# Patient Record
Sex: FEMALE | Race: WHITE | HISPANIC OR LATINO | Employment: STUDENT | URBAN - METROPOLITAN AREA
[De-identification: names, ages, dates, MRNs, and addresses within clinical notes are randomized per-mention and may not be internally consistent; named-entity substitution may affect disease eponyms.]

---

## 2021-03-24 ENCOUNTER — APPOINTMENT (OUTPATIENT)
Dept: LAB | Facility: CLINIC | Age: 21
End: 2021-03-24
Payer: COMMERCIAL

## 2021-03-24 ENCOUNTER — OFFICE VISIT (OUTPATIENT)
Dept: INTERNAL MEDICINE CLINIC | Facility: CLINIC | Age: 21
End: 2021-03-24
Payer: COMMERCIAL

## 2021-03-24 ENCOUNTER — TELEPHONE (OUTPATIENT)
Dept: ADMINISTRATIVE | Facility: OTHER | Age: 21
End: 2021-03-24

## 2021-03-24 VITALS
HEIGHT: 65 IN | WEIGHT: 153.6 LBS | DIASTOLIC BLOOD PRESSURE: 88 MMHG | SYSTOLIC BLOOD PRESSURE: 138 MMHG | BODY MASS INDEX: 25.59 KG/M2 | HEART RATE: 76 BPM | TEMPERATURE: 98 F | OXYGEN SATURATION: 97 %

## 2021-03-24 DIAGNOSIS — F32.A MILD DEPRESSION: ICD-10-CM

## 2021-03-24 DIAGNOSIS — Z96.21 COCHLEAR IMPLANT IN PLACE: ICD-10-CM

## 2021-03-24 DIAGNOSIS — Z00.00 ANNUAL PHYSICAL EXAM: Primary | ICD-10-CM

## 2021-03-24 DIAGNOSIS — H90.5 CONGENITAL DEAFNESS: ICD-10-CM

## 2021-03-24 DIAGNOSIS — Z02.1 PRE-EMPLOYMENT EXAMINATION: ICD-10-CM

## 2021-03-24 PROCEDURE — 99385 PREV VISIT NEW AGE 18-39: CPT | Performed by: FAMILY MEDICINE

## 2021-03-24 PROCEDURE — 3008F BODY MASS INDEX DOCD: CPT | Performed by: FAMILY MEDICINE

## 2021-03-24 PROCEDURE — 36415 COLL VENOUS BLD VENIPUNCTURE: CPT

## 2021-03-24 PROCEDURE — 86480 TB TEST CELL IMMUN MEASURE: CPT

## 2021-03-24 PROCEDURE — 3725F SCREEN DEPRESSION PERFORMED: CPT | Performed by: FAMILY MEDICINE

## 2021-03-24 PROCEDURE — 1036F TOBACCO NON-USER: CPT | Performed by: FAMILY MEDICINE

## 2021-03-24 NOTE — PROGRESS NOTES
ADULT ANNUAL PHYSICAL   [address]    NAME: Cristina Figueroa  AGE: 24 y o  SEX: female  : 2000     DATE: 3/24/2021     Assessment and Plan:     Problem List Items Addressed This Visit        Nervous and Auditory    Congenital deafness       Other    Cochlear implant in place      Other Visit Diagnoses     Annual physical exam    -  Primary    Pre-employment examination        Relevant Orders    Quantiferon TB Gold Plus    Mild depression ([address] )            Plan: well adult  quantiferon order in cifuentes of TST  Pt will notify me if she needs immunization titers for employment  Follows with audiology yearly for cochlear implant maintenance  PHQ9 OF 5  Mild  No SI  Pt manages with forms of wellness such as exercise and yoga  Will continue to monitor  Immunizations and preventive care screenings were discussed with patient today  Appropriate education was printed on patient's after visit summary  Counseling:  Dental Health: discussed importance of regular tooth brushing, flossing, and dental visits  Injury prevention: discussed safety/seat belts, safety helmets, smoke detectors, carbon dioxide detectors, and smoking near bedding or upholstery  · Sexual health: discussed sexually transmitted diseases, partner selection, use of condoms, avoidance of unintended pregnancy, and contraceptive alternatives  BMI Counseling: Body mass index is 25 96 kg/m²  The BMI is above normal  Nutrition recommendations include limiting drinks that contain sugar and moderation in carbohydrate intake  Return in about 1 year (around 3/24/2022) for Annual physical      Chief Complaint:     Chief Complaint   Patient presents with    Establish Care      History of Present Illness:     Adult Annual Physical   Patient here for a comprehensive physical exam  The patient reports no problems  Need physical form completed for school  Going to [employer] and [employer] [place] Diet and Physical Activity  · Diet/Nutrition: well balanced diet  · Exercise: 3-4 times a week on average  Depression Screening  PHQ-9 Depression Screening    PHQ-9:   Frequency of the following problems over the past two weeks:      Little interest or pleasure in doing things: 0 - not at all  Feeling down, depressed, or hopeless: 1 - several days  Trouble falling or staying asleep, or sleeping too much: 2 - more than half the days  Feeling tired or having little energy: 0 - not at all  Poor appetite or overeatin - several days  Feeling bad about yourself - or that you are a failure or have let yourself or your family down: 1 - several days  Trouble concentrating on things, such as reading the newspaper or watching television: 0 - not at all  Moving or speaking so slowly that other people could have noticed  Or the opposite - being so fidgety or restless that you have been moving around a lot more than usual: 0 - not at all  Thoughts that you would be better off dead, or of hurting yourself in some way: 0 - not at all  PHQ-2 Score: 1  PHQ-9 Score: 5       General Health  · Sleep: sleeps well  · Hearing: cochlear implant right side only   · Vision: no vision problems  · Dental: regular dental visits  /GYN Health  · Last menstrual period: 3/15/2021  · Contraceptive method: none   · History of STDs?: no      Review of Systems:     Review of Systems   HENT: Positive for hearing loss  Eyes: Negative for visual disturbance  Respiratory: Negative for shortness of breath  Cardiovascular: Negative for leg swelling  Gastrointestinal: Negative for constipation and diarrhea  Genitourinary: Negative for difficulty urinating and frequency  Musculoskeletal: Negative for arthralgias, back pain and gait problem  Neurological: Negative for dizziness, light-headedness and headaches  Psychiatric/Behavioral: Negative for sleep disturbance and suicidal ideas   The patient is not nervous/anxious  Past Medical History:     Past Medical History:   Diagnosis Date    Deaf, bilateral       Past Surgical History:     Past Surgical History:   Procedure Laterality Date    COCHLEAR IMPLANT Right     REDUCTION MAMMAPLASTY        Social History:     E-Cigarette/Vaping    E-Cigarette Use Never User      E-Cigarette/Vaping Substances    Nicotine No     THC No     CBD No     Flavoring No     Other No     Unknown No      Social History     Socioeconomic History    Marital status: Single     Spouse name: None    Number of children: None    Years of education: None    Highest education level: None   Occupational History    None   Social Needs    Financial resource strain: None    Food insecurity     Worry: None     Inability: None    Transportation needs     Medical: None     Non-medical: None   Tobacco Use    Smoking status: Never Smoker    Smokeless tobacco: Never Used   Substance and Sexual Activity    Alcohol use: Yes     Frequency: 2-3 times a week     Drinks per session: 1 or 2     Binge frequency: Never     Comment: wine-socially    Drug use: Not Currently    Sexual activity: Not Currently     Partners: Male   Lifestyle    Physical activity     Days per week: 7 days     Minutes per session: 40 min    Stress: Not at all   Relationships    Social connections     Talks on phone: None     Gets together: None     Attends [employer] service: None     Active member of club or organization: None     Attends meetings of clubs or organizations: None     Relationship status: None    Intimate partner violence     Fear of current or ex partner: None     Emotionally abused: None     Physically abused: None     Forced sexual activity: None   Other Topics Concern    None   Social History Narrative    None      Family History:     History reviewed  No pertinent family history  Current Medications:     No current outpatient medications on file       No current facility-administered medications for this visit         Allergies:     No Known Allergies   Physical Exam:     /88 (BP Location: Left arm, Patient Position: Sitting, Cuff Size: Standard)   Pulse 76   Temp 98 °F (36 7 °C) (Temporal) Comment: NO NSAIDS  Ht 5' 4 5" (1 638 m)   Wt 69 7 kg (153 lb 9 6 oz)   SpO2 97%   BMI 25 96 kg/m²     Physical Exam     Jori Reynolds,    MEDICAL [address]

## 2021-03-24 NOTE — TELEPHONE ENCOUNTER
----- Message from Valeriano Moody sent at 3/24/2021  9:52 AM EDT -----  Regarding: PAP SMEAR [facility] INTERNAL MED  03/24/21 9:53 AM    Hello, our patient Apryl Parada has had Pap Smear (HPV) aka Cervical Cancer Screening completed/performed   Please assist in updating the patient chart by making an External outreach to [facility] located in [facility] The date of service is  per pt states this was recent P#;[phone]     Thank you,  Sarah Bullard MA  [facility]  [address]

## 2021-03-24 NOTE — PATIENT INSTRUCTIONS

## 2021-03-24 NOTE — LETTER
Procedure Request Form: Cervical Cancer Screening      Date Requested: 21  Patient: Ailyn Sale  Patient : 2000   Referring Provider: Benito Ingram, DO        Date of Procedure ______________________________       The above patient has informed us that they have completed their   most recent Cervical Cancer Screening at your facility  Please complete   this form and attach all corresponding procedure reports/results  Comments __________________________________________________________  ____________________________________________________________________  ____________________________________________________________________  ____________________________________________________________________    Facility Completing Procedure _________________________________________    Form Completed By (print name) _______________________________________      Signature __________________________________________________________      These reports are needed for  compliance    Please fax this completed form and a copy of the procedure report to our office located at [place] [address] as soon as possible to [phone] jerod Roman [phone]    We thank you for your assistance in treating our mutual patient     [facility]  [phone]

## 2021-03-24 NOTE — LETTER
Procedure Request Form: Cervical Cancer Screening      Date Requested: 21  Patient: Fara Bank  Patient : 2000   Referring Provider: Juanis Luong, DO        Date of Procedure ______________________________       The above patient has informed us that they have completed their   most recent Cervical Cancer Screening at your facility  Please complete   this form and attach all corresponding procedure reports/results  Comments __________________________________________________________  ____________________________________________________________________  ____________________________________________________________________  ____________________________________________________________________    Facility Completing Procedure _________________________________________    Form Completed By (print name) _______________________________________      Signature __________________________________________________________      These reports are needed for  compliance    Please fax this completed form and a copy of the procedure report to our office located at [place] [address] as soon as possible to [phone] jerod [address] [phone]    We thank you for your assistance in treating our mutual patient     [facility]  [phone]

## 2021-03-24 NOTE — LETTER
Procedure Request Form: Cervical Cancer Screening      Date Requested: 21  Patient: Ailyn Sale  Patient : 2000   Referring Provider: Benito Ingram, DO        Date of Procedure ______________________________       The above patient has informed us that they have completed their   most recent Cervical Cancer Screening at your facility  Please complete   this form and attach all corresponding procedure reports/results  Comments __________________________________________________________  ____________________________________________________________________  ____________________________________________________________________  ____________________________________________________________________    Facility Completing Procedure _________________________________________    Form Completed By (print name) _______________________________________      Signature __________________________________________________________      These reports are needed for  compliance    Please fax this completed form and a copy of the procedure report to our office located at [place] [address] as soon as possible to [phone] jerod Roman [phone]    We thank you for your assistance in treating our mutual patient       Medical Records [address] [place] [address]  [phone] F [phone]

## 2021-03-25 LAB
GAMMA INTERFERON BACKGROUND BLD IA-ACNC: 0.03 IU/ML
M TB IFN-G BLD-IMP: NEGATIVE
M TB IFN-G CD4+ BCKGRND COR BLD-ACNC: 0 IU/ML
M TB IFN-G CD4+ BCKGRND COR BLD-ACNC: 0.01 IU/ML
MITOGEN IGNF BCKGRD COR BLD-ACNC: >10 IU/ML

## 2021-03-25 NOTE — TELEPHONE ENCOUNTER
Upon review of the In Basket request and the patient's chart, initial outreach has been made via fax, please see Contacts section for details       Thank you  Isra Lewis

## 2021-04-01 NOTE — TELEPHONE ENCOUNTER
As a follow-up, a second attempt has been made for outreach via fax, please see Contacts section for details      Thank you  Jarrell Sequeira

## 2021-04-07 NOTE — TELEPHONE ENCOUNTER
As a final attempt, a third outreach has been made via telephone call  The outcome of the telephone call was a fax request form to be sent to Office  Please see Contacts section for details  This encounter will be closed and completed by end of day  Should we receive the requested information because of previous outreach attempts, the requested patient's chart will be updated appropriately       Thank you  René Lopez

## 2021-05-10 NOTE — TELEPHONE ENCOUNTER
Upon review of the In Basket request we have found as a result of outreach that patient did not have the requested item(s) completed  Any additional questions or concerns should be emailed to the Practice Liaisons via [email or web address] email, please do not reply via In Basket      Thank you  Lamar Bender

## 2021-05-26 ENCOUNTER — TELEPHONE (OUTPATIENT)
Dept: INTERNAL MEDICINE CLINIC | Facility: CLINIC | Age: 21
End: 2021-05-26

## 2021-05-26 ENCOUNTER — APPOINTMENT (OUTPATIENT)
Dept: LAB | Facility: CLINIC | Age: 21
End: 2021-05-26
Payer: COMMERCIAL

## 2021-05-26 DIAGNOSIS — Z02.1 PRE-EMPLOYMENT EXAMINATION: ICD-10-CM

## 2021-05-26 DIAGNOSIS — Z02.1 PRE-EMPLOYMENT EXAMINATION: Primary | ICD-10-CM

## 2021-05-26 LAB — HBV SURFACE AB SER-ACNC: 103.75 MIU/ML

## 2021-05-26 PROCEDURE — 86706 HEP B SURFACE ANTIBODY: CPT

## 2021-05-26 PROCEDURE — 36415 COLL VENOUS BLD VENIPUNCTURE: CPT

## 2021-06-14 ENCOUNTER — TELEPHONE (OUTPATIENT)
Dept: INTERNAL MEDICINE CLINIC | Facility: CLINIC | Age: 21
End: 2021-06-14

## 2021-06-14 DIAGNOSIS — Z11.1 VISIT FOR TB SKIN TEST: Primary | ICD-10-CM

## 2021-06-15 ENCOUNTER — CLINICAL SUPPORT (OUTPATIENT)
Dept: INTERNAL MEDICINE CLINIC | Facility: CLINIC | Age: 21
End: 2021-06-15
Payer: COMMERCIAL

## 2021-06-15 DIAGNOSIS — Z11.1 PPD SCREENING TEST: Primary | ICD-10-CM

## 2021-06-15 PROCEDURE — 86580 TB INTRADERMAL TEST: CPT

## 2021-06-17 LAB
INDURATION: 0 MM
TB SKIN TEST: NEGATIVE

## 2021-10-04 ENCOUNTER — OFFICE VISIT (OUTPATIENT)
Dept: OBGYN CLINIC | Facility: CLINIC | Age: 21
End: 2021-10-04
Payer: COMMERCIAL

## 2021-10-04 VITALS
BODY MASS INDEX: 26.49 KG/M2 | SYSTOLIC BLOOD PRESSURE: 120 MMHG | DIASTOLIC BLOOD PRESSURE: 76 MMHG | HEIGHT: 65 IN | WEIGHT: 159 LBS

## 2021-10-04 DIAGNOSIS — Z77.21 EXPOSURE TO POTENTIALLY HAZARDOUS BODY FLUIDS: Primary | ICD-10-CM

## 2021-10-04 DIAGNOSIS — R10.2 PELVIC PAIN: ICD-10-CM

## 2021-10-04 PROCEDURE — 1036F TOBACCO NON-USER: CPT | Performed by: OBSTETRICS & GYNECOLOGY

## 2021-10-04 PROCEDURE — 87591 N.GONORRHOEAE DNA AMP PROB: CPT | Performed by: OBSTETRICS & GYNECOLOGY

## 2021-10-04 PROCEDURE — G0145 SCR C/V CYTO,THINLAYER,RESCR: HCPCS | Performed by: OBSTETRICS & GYNECOLOGY

## 2021-10-04 PROCEDURE — 3008F BODY MASS INDEX DOCD: CPT | Performed by: OBSTETRICS & GYNECOLOGY

## 2021-10-04 PROCEDURE — 99203 OFFICE O/P NEW LOW 30 MIN: CPT | Performed by: OBSTETRICS & GYNECOLOGY

## 2021-10-04 PROCEDURE — 87491 CHLMYD TRACH DNA AMP PROBE: CPT | Performed by: OBSTETRICS & GYNECOLOGY

## 2021-10-04 RX ORDER — METRONIDAZOLE 500 MG/1
500 TABLET ORAL EVERY 12 HOURS SCHEDULED
Qty: 14 TABLET | Refills: 0 | Status: SHIPPED | OUTPATIENT
Start: 2021-10-04 | End: 2021-10-11

## 2021-10-06 ENCOUNTER — HOSPITAL ENCOUNTER (OUTPATIENT)
Dept: ULTRASOUND IMAGING | Facility: HOSPITAL | Age: 21
Discharge: HOME | End: 2021-10-06
Attending: OBSTETRICS & GYNECOLOGY
Payer: COMMERCIAL

## 2021-10-06 DIAGNOSIS — R10.2 PELVIC PAIN: ICD-10-CM

## 2021-10-06 LAB
C TRACH DNA SPEC QL NAA+PROBE: NEGATIVE
N GONORRHOEA DNA SPEC QL NAA+PROBE: NEGATIVE

## 2021-10-06 PROCEDURE — 76830 TRANSVAGINAL US NON-OB: CPT

## 2021-10-06 PROCEDURE — 76856 US EXAM PELVIC COMPLETE: CPT

## 2021-10-08 LAB
LAB AP GYN PRIMARY INTERPRETATION: NORMAL
Lab: NORMAL

## 2021-10-11 ENCOUNTER — TELEPHONE (OUTPATIENT)
Dept: OBGYN CLINIC | Facility: CLINIC | Age: 21
End: 2021-10-11

## 2021-11-11 ENCOUNTER — HOSPITAL ENCOUNTER (OUTPATIENT)
Dept: ULTRASOUND IMAGING | Facility: CLINIC | Age: 21
Discharge: HOME | End: 2021-11-11
Payer: COMMERCIAL

## 2021-11-11 DIAGNOSIS — N83.201 RIGHT OVARIAN CYST: ICD-10-CM

## 2021-11-11 PROCEDURE — 76830 TRANSVAGINAL US NON-OB: CPT

## 2021-11-11 PROCEDURE — 76856 US EXAM PELVIC COMPLETE: CPT

## 2021-11-24 ENCOUNTER — TELEPHONE (OUTPATIENT)
Dept: OBGYN CLINIC | Facility: CLINIC | Age: 21
End: 2021-11-24

## 2022-01-11 ENCOUNTER — OFFICE VISIT (OUTPATIENT)
Dept: OBGYN CLINIC | Facility: CLINIC | Age: 22
End: 2022-01-11
Payer: COMMERCIAL

## 2022-01-11 VITALS — BODY MASS INDEX: 25.19 KG/M2 | SYSTOLIC BLOOD PRESSURE: 110 MMHG | WEIGHT: 151.4 LBS | DIASTOLIC BLOOD PRESSURE: 70 MMHG

## 2022-01-11 DIAGNOSIS — R10.2 PELVIC PAIN: ICD-10-CM

## 2022-01-11 DIAGNOSIS — N83.202 LEFT OVARIAN CYST: Primary | ICD-10-CM

## 2022-01-11 PROCEDURE — 1036F TOBACCO NON-USER: CPT | Performed by: OBSTETRICS & GYNECOLOGY

## 2022-01-11 PROCEDURE — 99214 OFFICE O/P EST MOD 30 MIN: CPT | Performed by: OBSTETRICS & GYNECOLOGY

## 2022-01-11 NOTE — PROGRESS NOTES
Assessment/Plan:    Left ovarian cyst  Persistent Left ovarian cyst   Plan for Laparoscopic removal of Left Ovarian Cyst    We reviewed the risks of the surgery including but not limited to infection, bleeding, injury to nearby organs (bowel  bladder, ureter, blood vessel, nerve) and possible long term consequences of such injury, as well as possibility of oopherectomy, conversion to laparotomy, need for blood transfusion and other resuscitative measures  Diagnoses and all orders for this visit:    Left ovarian cyst    Pelvic pain          Subjective:      Patient ID: Tahira Woodall is a 24 y o  female  F/u pelvic u/s 611    24year old with left ovarian cyst that has been present over 2 months  Patient reports bloating, moderate pain  She works as a  at a [email or web address]  Recently moved to her own apartment  Not currently in a relationship  Wants definitive treatment for this cyst   Does not want hormonal pills to reduce ovarian cyst formation  Gynecologic Exam  The patient's primary symptoms include pelvic pain  This is a new problem  The current episode started more than 1 month ago  The problem occurs daily  The problem has been waxing and waning  The pain is moderate  The problem affects the left side  Associated symptoms include abdominal pain  Pertinent negatives include no back pain, chills, constipation, diarrhea, dysuria, fever, frequency, headaches, nausea, painful intercourse, sore throat, urgency or vomiting  The symptoms are aggravated by activity, tactile pressure and heavy lifting  She has tried acetaminophen, heating pads and NSAIDs for the symptoms  The treatment provided mild relief  She is sexually active  Her menstrual history has been regular         The following portions of the patient's history were reviewed and updated as appropriate: She  has a past medical history of Deaf, bilateral   She   Patient Active Problem List    Diagnosis Date Noted    Left ovarian cyst 01/12/2022    Pelvic pain 10/04/2021    Congenital deafness 03/24/2021    Cochlear implant in place 03/24/2021    Mild depression ([address] ) 03/24/2021     She  has a past surgical history that includes Reduction mammaplasty and Cochlear implant (Right)  Her family history is not on file  She  reports that she has never smoked  She has never used smokeless tobacco  She reports current alcohol use  She reports previous drug use  No current outpatient medications on file  No current facility-administered medications for this visit  No current outpatient medications on file prior to visit  No current facility-administered medications on file prior to visit  She has No Known Allergies       Review of Systems   Constitutional: Negative for activity change, appetite change, chills, fatigue and fever  HENT: Negative for rhinorrhea, sneezing and sore throat  Eyes: Negative for visual disturbance  Respiratory: Negative for cough, shortness of breath and wheezing  Cardiovascular: Negative for chest pain, palpitations and leg swelling  Gastrointestinal: Positive for abdominal pain  Negative for abdominal distention, constipation, diarrhea, nausea and vomiting  Genitourinary: Positive for pelvic pain  Negative for difficulty urinating, dysuria, frequency and urgency  Musculoskeletal: Negative for back pain  Neurological: Negative for syncope, light-headedness and headaches  Objective:      /70   Wt 68 7 kg (151 lb 6 4 oz)   BMI 25 19 kg/m²          Physical Exam  Constitutional:       General: She is not in acute distress  Appearance: She is well-developed  She is not diaphoretic  Neck:      Vascular: No JVD  Cardiovascular:      Rate and Rhythm: Normal rate and regular rhythm  Heart sounds: Normal heart sounds  No murmur heard  No friction rub  No gallop  Pulmonary:      Effort: Pulmonary effort is normal  No respiratory distress        Breath sounds: Normal breath sounds  Abdominal:      General: Bowel sounds are normal  There is no distension  Palpations: Abdomen is soft  Tenderness: There is no abdominal tenderness  There is no guarding or rebound  Genitourinary:     Labia:         Right: No rash, tenderness or lesion  Left: No rash, tenderness or lesion  Vagina: Normal  No vaginal discharge, erythema or tenderness  Cervix: No cervical motion tenderness, discharge or friability  Uterus: Not deviated, not enlarged, not fixed and not tender  Adnexa:         Right: No mass, tenderness or fullness  Left: Tenderness and fullness present  No mass  Musculoskeletal:      Cervical back: Neck supple  Right lower leg: No edema  Left lower leg: No edema  Lymphadenopathy:      Cervical: No cervical adenopathy  Neurological:      Mental Status: She is alert and oriented to person, place, and time  Deep Tendon Reflexes: Reflexes are normal and symmetric

## 2022-01-11 NOTE — PATIENT INSTRUCTIONS
Ovarian Cyst   WHAT YOU NEED TO KNOW:   What is an ovarian cyst?  An ovarian cyst is a fluid-filled sac that grows in or on an ovary  You have 2 ovaries, 1 on each side of your uterus  They are small, about the size and shape of an almond  Ovarian cysts are common in women who have regular monthly cycles  During your monthly cycle, eggs are released from the ovaries  The cyst usually contains fluid but may sometimes have blood or tissue in it  Most ovarian cysts are harmless and go away without treatment in a few months  Some cysts can grow large, cause pain, or break open  What causes an ovarian cyst?   · Problems with your hormones    · Medicines that help you ovulate    · Endometriosis    · Pregnancy    · A severe infection in your pelvis    What are the signs and symptoms of an ovarian cyst?  You may have pressure, bloating or swelling in your lower abdomen on the side of the cyst  You may also have dull or sharp pain that may come and go  The following are less common signs and symptoms:  · A dull ache in your lower back and thighs    · Unusual vaginal bleeding    · Weight gain you did not expect or plan    · Pain during your monthly cycle    · Tenderness in your breasts    · Trouble completely emptying your bowels or bladder    · The need to urinate often    · Pain during sex    How is an ovarian cyst diagnosed? · Blood tests  may show what type of cyst you have and if you need treatment  · A pelvic exam  may help your healthcare provider feel an ovarian cyst     · A pelvic ultrasound  may show a cyst on your ovary  An ultrasound wand uses sound waves to show pictures on a monitor  The wand is inserted into your vagina and guided up toward your uterus  How is an ovarian cyst treated? Treatment will depend on your age, symptoms, and the kind of cyst you have  You may need any of the following:  · Watchful waiting  may be recommended  This means the cyst is not treated right away   You will need to watch for any signs or symptoms that the cyst is growing  You may need to return for one or more ultrasounds after a certain period of time  These will show if your cyst has changed in size  · Medicines:      ? Birth control pills  may help control your monthly cycle, prevent cysts, or cause them to shrink  ? Acetaminophen  decreases pain and fever  It is available without a doctor's order  Ask how much to take and how often to take it  Follow directions  Read the labels of all other medicines you are using to see if they also contain acetaminophen, or ask your doctor or pharmacist  Acetaminophen can cause liver damage if not taken correctly  Do not use more than 4 grams (4,000 milligrams) total of acetaminophen in one day  ? NSAIDs , such as ibuprofen, help decrease swelling, pain, and fever  This medicine is available with or without a doctor's order  NSAIDs can cause stomach bleeding or kidney problems in certain people  If you take blood thinner medicine, always ask your healthcare provider if NSAIDs are safe for you  Always read the medicine label and follow directions  ? Prescription pain medicine  may be given  Ask your healthcare provider how to take this medicine safely  Some prescription pain medicines contain acetaminophen  Do not take other medicines that contain acetaminophen without talking to your healthcare provider  Too much acetaminophen may cause liver damage  Prescription pain medicine may cause constipation  Ask your healthcare provider how to prevent or treat constipation  · Surgery  may be needed to remove the ovarian cyst     How can I manage ovarian cysts? You can manage a current cyst and help healthcare providers find future cysts early  · Apply heat to decrease pain and cramping from a cyst   Sit in a warm bath, or place a heating pad (turned on low) on your abdomen  Do this for 15 to 20 minutes every hour for comfort  · Get regular pelvic exams or Pap smears  This will help providers find any new ovarian cysts  Tell your healthcare provider about any unusual changes in your monthly cycle  Call your local emergency number (911 in the [address]) if:   · You have severe pain with fever and vomiting  · You have sudden, severe abdominal pain  · You are too weak, faint, or dizzy to stand up  · You are breathing very quickly  When should I call my doctor? · Your periods are early, late, or more painful than usual     · You have questions or concerns about your condition or care  CARE AGREEMENT:   You have the right to help plan your care  Learn about your health condition and how it may be treated  Discuss treatment options with your healthcare providers to decide what care you want to receive  You always have the right to refuse treatment  The above information is an  only  It is not intended as medical advice for individual conditions or treatments  Talk to your doctor, nurse or pharmacist before following any medical regimen to see if it is safe and effective for you  © Copyright [employer] 2021 Information is for End User's use only and may not be sold, redistributed or otherwise used for commercial purposes   All illustrations and images included in CareNotes® are the copyrighted property of A D A M , Inc  or [address] [place], [place] [address]!

## 2022-01-12 PROBLEM — N83.202 LEFT OVARIAN CYST: Status: ACTIVE | Noted: 2022-01-12

## 2022-01-13 NOTE — ASSESSMENT & PLAN NOTE
Persistent Left ovarian cyst   Plan for Laparoscopic removal of Left Ovarian Cyst    We reviewed the risks of the surgery including but not limited to infection, bleeding, injury to nearby organs (bowel  bladder, ureter, blood vessel, nerve) and possible long term consequences of such injury, as well as possibility of oopherectomy, conversion to laparotomy, need for blood transfusion and other resuscitative measures

## 2022-03-15 PROCEDURE — NC001 PR NO CHARGE: Performed by: OBSTETRICS & GYNECOLOGY

## 2022-03-15 NOTE — H&P
Assessment/Plan:    Left ovarian cyst  Persistent Left ovarian cyst   Plan for Laparoscopic removal of Left Ovarian Cyst    We reviewed the risks of the surgery including but not limited to infection, bleeding, injury to nearby organs (bowel  bladder, ureter, blood vessel, nerve) and possible long term consequences of such injury, as well as possibility of oopherectomy, conversion to laparotomy, need for blood transfusion and other resuscitative measures  Diagnoses and all orders for this visit:    Left ovarian cyst    Pelvic pain          Subjective:      Patient ID: Jameel Rivera is a 25 y o  female  F/u pelvic u/s 611    24year old with left ovarian cyst that has been present over 2 months  Patient reports bloating, moderate pain  She works as a  at a [employer]  Recently moved to her own apartment  Not currently in a relationship  Wants definitive treatment for this cyst   Does not want hormonal pills to reduce ovarian cyst formation  Gynecologic Exam  The patient's primary symptoms include pelvic pain  This is a new problem  The current episode started more than 1 month ago  The problem occurs daily  The problem has been waxing and waning  The pain is moderate  The problem affects the left side  Associated symptoms include abdominal pain  Pertinent negatives include no back pain, chills, constipation, diarrhea, dysuria, fever, frequency, headaches, nausea, painful intercourse, sore throat, urgency or vomiting  The symptoms are aggravated by activity, tactile pressure and heavy lifting  She has tried acetaminophen, heating pads and NSAIDs for the symptoms  The treatment provided mild relief  She is sexually active  Her menstrual history has been regular         The following portions of the patient's history were reviewed and updated as appropriate: She  has a past medical history of Deaf, bilateral   She   Patient Active Problem List    Diagnosis Date Noted    Left ovarian cyst 01/12/2022    Pelvic pain 10/04/2021    Congenital deafness 03/24/2021    Cochlear implant in place 03/24/2021    Mild depression ([address] ) 03/24/2021     She  has a past surgical history that includes Reduction mammaplasty and Cochlear implant (Right)  Her family history is not on file  She  reports that she has never smoked  She has never used smokeless tobacco  She reports current alcohol use  She reports previous drug use  No current facility-administered medications for this encounter  No current outpatient medications on file  No current facility-administered medications on file prior to encounter  No current outpatient medications on file prior to encounter  She has No Known Allergies       Review of Systems   Constitutional: Negative for activity change, appetite change, chills, fatigue and fever  HENT: Negative for rhinorrhea, sneezing and sore throat  Eyes: Negative for visual disturbance  Respiratory: Negative for cough, shortness of breath and wheezing  Cardiovascular: Negative for chest pain, palpitations and leg swelling  Gastrointestinal: Positive for abdominal pain  Negative for abdominal distention, constipation, diarrhea, nausea and vomiting  Genitourinary: Positive for pelvic pain  Negative for difficulty urinating, dysuria, frequency and urgency  Musculoskeletal: Negative for back pain  Neurological: Negative for syncope, light-headedness and headaches  Objective: There were no vitals taken for this visit  Physical Exam  Constitutional:       General: She is not in acute distress  Appearance: She is well-developed  She is not diaphoretic  Neck:      Vascular: No JVD  Cardiovascular:      Rate and Rhythm: Normal rate and regular rhythm  Heart sounds: Normal heart sounds  No murmur heard  No friction rub  No gallop  Pulmonary:      Effort: Pulmonary effort is normal  No respiratory distress        Breath sounds: Normal breath sounds  Abdominal:      General: Bowel sounds are normal  There is no distension  Palpations: Abdomen is soft  Tenderness: There is no abdominal tenderness  There is no guarding or rebound  Genitourinary:     Labia:         Right: No rash, tenderness or lesion  Left: No rash, tenderness or lesion  Vagina: Normal  No vaginal discharge, erythema or tenderness  Cervix: No cervical motion tenderness, discharge or friability  Uterus: Not deviated, not enlarged, not fixed and not tender  Adnexa:         Right: No mass, tenderness or fullness  Left: Tenderness and fullness present  No mass  Musculoskeletal:      Cervical back: Neck supple  Right lower leg: No edema  Left lower leg: No edema  Lymphadenopathy:      Cervical: No cervical adenopathy  Neurological:      Mental Status: She is alert and oriented to person, place, and time  Deep Tendon Reflexes: Reflexes are normal and symmetric

## 2022-03-15 NOTE — PRE-PROCEDURE INSTRUCTIONS
No outpatient medications have been marked as taking for the [facility]  Pre-op medication, and showering instructions with antibacteral soap reviewed  Instructed to avoid all ASA/NSAIDs and OTC Vit/Supp from now until after surgery per anesthesia guidelines  Tylenol ok prn  Pt  Verbalized an understanding of all instructions reviewed and offers no concerns at this time

## 2022-03-17 ENCOUNTER — ANESTHESIA EVENT (OUTPATIENT)
Dept: PERIOP | Facility: HOSPITAL | Age: 22
End: 2022-03-17
Payer: COMMERCIAL

## 2022-03-18 ENCOUNTER — ANESTHESIA (OUTPATIENT)
Dept: PERIOP | Facility: HOSPITAL | Age: 22
End: 2022-03-18
Payer: COMMERCIAL

## 2022-03-18 ENCOUNTER — HOSPITAL ENCOUNTER (OUTPATIENT)
Facility: HOSPITAL | Age: 22
Setting detail: OUTPATIENT SURGERY
Discharge: HOME | End: 2022-03-18
Attending: OBSTETRICS & GYNECOLOGY | Admitting: OBSTETRICS & GYNECOLOGY
Payer: COMMERCIAL

## 2022-03-18 VITALS
HEART RATE: 78 BPM | WEIGHT: 155.87 LBS | SYSTOLIC BLOOD PRESSURE: 105 MMHG | HEIGHT: 65 IN | DIASTOLIC BLOOD PRESSURE: 61 MMHG | OXYGEN SATURATION: 100 % | TEMPERATURE: 97.6 F | BODY MASS INDEX: 25.97 KG/M2 | RESPIRATION RATE: 25 BRPM

## 2022-03-18 DIAGNOSIS — N83.202 LEFT OVARIAN CYST: ICD-10-CM

## 2022-03-18 DIAGNOSIS — G89.18 POSTOPERATIVE PAIN: Primary | ICD-10-CM

## 2022-03-18 PROBLEM — Z98.890 S/P OVARIAN CYSTECTOMY: Status: ACTIVE | Noted: 2022-03-18

## 2022-03-18 PROBLEM — Z87.42 S/P OVARIAN CYSTECTOMY: Status: ACTIVE | Noted: 2022-03-18

## 2022-03-18 LAB
EXT PREGNANCY TEST URINE: NEGATIVE
EXT. CONTROL: NORMAL

## 2022-03-18 PROCEDURE — 81025 URINE PREGNANCY TEST: CPT | Performed by: OBSTETRICS & GYNECOLOGY

## 2022-03-18 PROCEDURE — 58662 LAPAROSCOPY EXCISE LESIONS: CPT | Performed by: OBSTETRICS & GYNECOLOGY

## 2022-03-18 PROCEDURE — 88307 TISSUE EXAM BY PATHOLOGIST: CPT | Performed by: PATHOLOGY

## 2022-03-18 RX ORDER — LIDOCAINE HYDROCHLORIDE 10 MG/ML
0.5 INJECTION, SOLUTION EPIDURAL; INFILTRATION; INTRACAUDAL; PERINEURAL ONCE AS NEEDED
Status: DISCONTINUED | OUTPATIENT
Start: 2022-03-18 | End: 2022-03-18

## 2022-03-18 RX ORDER — OXYCODONE HYDROCHLORIDE 5 MG/1
5 TABLET ORAL EVERY 4 HOURS PRN
Qty: 5 TABLET | Refills: 0 | Status: SHIPPED | OUTPATIENT
Start: 2022-03-18 | End: 2022-03-28

## 2022-03-18 RX ORDER — OXYCODONE HYDROCHLORIDE 5 MG/1
5 TABLET ORAL EVERY 4 HOURS PRN
Status: DISCONTINUED | OUTPATIENT
Start: 2022-03-18 | End: 2022-03-18 | Stop reason: HOSPADM

## 2022-03-18 RX ORDER — OXYCODONE HYDROCHLORIDE 10 MG/1
10 TABLET ORAL EVERY 4 HOURS PRN
Status: DISCONTINUED | OUTPATIENT
Start: 2022-03-18 | End: 2022-03-18 | Stop reason: HOSPADM

## 2022-03-18 RX ORDER — ACETAMINOPHEN 325 MG/1
975 TABLET ORAL EVERY 6 HOURS PRN
Status: DISCONTINUED | OUTPATIENT
Start: 2022-03-18 | End: 2022-03-18 | Stop reason: HOSPADM

## 2022-03-18 RX ORDER — LIDOCAINE HYDROCHLORIDE AND EPINEPHRINE 10; 10 MG/ML; UG/ML
INJECTION, SOLUTION INFILTRATION; PERINEURAL AS NEEDED
Status: DISCONTINUED | OUTPATIENT
Start: 2022-03-18 | End: 2022-03-18 | Stop reason: HOSPADM

## 2022-03-18 RX ORDER — ROCURONIUM BROMIDE 10 MG/ML
INJECTION, SOLUTION INTRAVENOUS AS NEEDED
Status: DISCONTINUED | OUTPATIENT
Start: 2022-03-18 | End: 2022-03-18

## 2022-03-18 RX ORDER — SODIUM CHLORIDE, SODIUM LACTATE, POTASSIUM CHLORIDE, CALCIUM CHLORIDE 600; 310; 30; 20 MG/100ML; MG/100ML; MG/100ML; MG/100ML
125 INJECTION, SOLUTION INTRAVENOUS CONTINUOUS
Status: DISCONTINUED | OUTPATIENT
Start: 2022-03-18 | End: 2022-03-18

## 2022-03-18 RX ORDER — IBUPROFEN 600 MG/1
600 TABLET ORAL EVERY 6 HOURS PRN
Qty: 30 TABLET | Refills: 0 | Status: SHIPPED | OUTPATIENT
Start: 2022-03-18

## 2022-03-18 RX ORDER — MEPERIDINE HYDROCHLORIDE 50 MG/ML
12.5 INJECTION INTRAMUSCULAR; INTRAVENOUS; SUBCUTANEOUS ONCE
Status: COMPLETED | OUTPATIENT
Start: 2022-03-18 | End: 2022-03-18

## 2022-03-18 RX ORDER — PROPOFOL 10 MG/ML
INJECTION, EMULSION INTRAVENOUS AS NEEDED
Status: DISCONTINUED | OUTPATIENT
Start: 2022-03-18 | End: 2022-03-18

## 2022-03-18 RX ORDER — DEXAMETHASONE SODIUM PHOSPHATE 10 MG/ML
INJECTION, SOLUTION INTRAMUSCULAR; INTRAVENOUS AS NEEDED
Status: DISCONTINUED | OUTPATIENT
Start: 2022-03-18 | End: 2022-03-18

## 2022-03-18 RX ORDER — ONDANSETRON 2 MG/ML
INJECTION INTRAMUSCULAR; INTRAVENOUS AS NEEDED
Status: DISCONTINUED | OUTPATIENT
Start: 2022-03-18 | End: 2022-03-18

## 2022-03-18 RX ORDER — HYDROMORPHONE HCL/PF 1 MG/ML
0.2 SYRINGE (ML) INJECTION
Status: DISCONTINUED | OUTPATIENT
Start: 2022-03-18 | End: 2022-03-18 | Stop reason: HOSPADM

## 2022-03-18 RX ORDER — CEFAZOLIN SODIUM 1 G/50ML
1000 SOLUTION INTRAVENOUS ONCE
Status: COMPLETED | OUTPATIENT
Start: 2022-03-18 | End: 2022-03-18

## 2022-03-18 RX ORDER — GLYCOPYRROLATE 0.2 MG/ML
INJECTION INTRAMUSCULAR; INTRAVENOUS AS NEEDED
Status: DISCONTINUED | OUTPATIENT
Start: 2022-03-18 | End: 2022-03-18

## 2022-03-18 RX ORDER — FENTANYL CITRATE 50 UG/ML
INJECTION, SOLUTION INTRAMUSCULAR; INTRAVENOUS AS NEEDED
Status: DISCONTINUED | OUTPATIENT
Start: 2022-03-18 | End: 2022-03-18

## 2022-03-18 RX ORDER — LIDOCAINE HYDROCHLORIDE 20 MG/ML
INJECTION, SOLUTION EPIDURAL; INFILTRATION; INTRACAUDAL; PERINEURAL AS NEEDED
Status: DISCONTINUED | OUTPATIENT
Start: 2022-03-18 | End: 2022-03-18

## 2022-03-18 RX ORDER — KETOROLAC TROMETHAMINE 30 MG/ML
INJECTION, SOLUTION INTRAMUSCULAR; INTRAVENOUS AS NEEDED
Status: DISCONTINUED | OUTPATIENT
Start: 2022-03-18 | End: 2022-03-18

## 2022-03-18 RX ORDER — ONDANSETRON 2 MG/ML
4 INJECTION INTRAMUSCULAR; INTRAVENOUS ONCE AS NEEDED
Status: DISCONTINUED | OUTPATIENT
Start: 2022-03-18 | End: 2022-03-18 | Stop reason: HOSPADM

## 2022-03-18 RX ORDER — MIDAZOLAM HYDROCHLORIDE 2 MG/2ML
INJECTION, SOLUTION INTRAMUSCULAR; INTRAVENOUS AS NEEDED
Status: DISCONTINUED | OUTPATIENT
Start: 2022-03-18 | End: 2022-03-18

## 2022-03-18 RX ORDER — FENTANYL CITRATE/PF 50 MCG/ML
25 SYRINGE (ML) INJECTION
Status: DISCONTINUED | OUTPATIENT
Start: 2022-03-18 | End: 2022-03-18 | Stop reason: HOSPADM

## 2022-03-18 RX ORDER — ONDANSETRON 2 MG/ML
4 INJECTION INTRAMUSCULAR; INTRAVENOUS EVERY 6 HOURS PRN
Status: DISCONTINUED | OUTPATIENT
Start: 2022-03-18 | End: 2022-03-18 | Stop reason: HOSPADM

## 2022-03-18 RX ADMIN — KETOROLAC TROMETHAMINE 30 MG: 30 INJECTION, SOLUTION INTRAMUSCULAR at 14:38

## 2022-03-18 RX ADMIN — MIDAZOLAM HYDROCHLORIDE 1 MG: 1 INJECTION, SOLUTION INTRAMUSCULAR; INTRAVENOUS at 13:41

## 2022-03-18 RX ADMIN — FENTANYL CITRATE 50 MCG: 50 INJECTION, SOLUTION INTRAMUSCULAR; INTRAVENOUS at 14:10

## 2022-03-18 RX ADMIN — GLYCOPYRROLATE 0.1 MG: 0.2 INJECTION, SOLUTION INTRAMUSCULAR; INTRAVENOUS at 13:47

## 2022-03-18 RX ADMIN — CEFAZOLIN SODIUM 1000 MG: 1 SOLUTION INTRAVENOUS at 13:31

## 2022-03-18 RX ADMIN — DEXAMETHASONE SODIUM PHOSPHATE 8 MG: 10 INJECTION, SOLUTION INTRAMUSCULAR; INTRAVENOUS at 13:50

## 2022-03-18 RX ADMIN — GLYCOPYRROLATE 0.1 MG: 0.2 INJECTION, SOLUTION INTRAMUSCULAR; INTRAVENOUS at 13:37

## 2022-03-18 RX ADMIN — ROCURONIUM BROMIDE 5 MG: 10 INJECTION, SOLUTION INTRAVENOUS at 14:10

## 2022-03-18 RX ADMIN — FENTANYL CITRATE 50 MCG: 50 INJECTION, SOLUTION INTRAMUSCULAR; INTRAVENOUS at 13:41

## 2022-03-18 RX ADMIN — ROCURONIUM BROMIDE 30 MG: 10 INJECTION, SOLUTION INTRAVENOUS at 13:48

## 2022-03-18 RX ADMIN — LIDOCAINE HYDROCHLORIDE 100 MG: 20 INJECTION, SOLUTION EPIDURAL; INFILTRATION; INTRACAUDAL; PERINEURAL at 13:47

## 2022-03-18 RX ADMIN — MIDAZOLAM HYDROCHLORIDE 1 MG: 1 INJECTION, SOLUTION INTRAMUSCULAR; INTRAVENOUS at 13:38

## 2022-03-18 RX ADMIN — MEPERIDINE HYDROCHLORIDE 12.5 MG: 50 INJECTION INTRAMUSCULAR; INTRAVENOUS; SUBCUTANEOUS at 15:12

## 2022-03-18 RX ADMIN — ONDANSETRON 4 MG: 2 INJECTION INTRAMUSCULAR; INTRAVENOUS at 14:27

## 2022-03-18 RX ADMIN — SODIUM CHLORIDE, SODIUM LACTATE, POTASSIUM CHLORIDE, AND CALCIUM CHLORIDE: .6; .31; .03; .02 INJECTION, SOLUTION INTRAVENOUS at 14:31

## 2022-03-18 RX ADMIN — PROPOFOL 50 MG: 10 INJECTION, EMULSION INTRAVENOUS at 13:49

## 2022-03-18 RX ADMIN — PROPOFOL 150 MG: 10 INJECTION, EMULSION INTRAVENOUS at 13:48

## 2022-03-18 RX ADMIN — SODIUM CHLORIDE, SODIUM LACTATE, POTASSIUM CHLORIDE, AND CALCIUM CHLORIDE 125 ML/HR: .6; .31; .03; .02 INJECTION, SOLUTION INTRAVENOUS at 12:52

## 2022-03-18 NOTE — DISCHARGE INSTRUCTIONS
Laparoscopic Excision of Ovarian Cysts   WHAT YOU NEED TO KNOW:   Laparoscopic excision is surgery to remove a cyst on your ovary  DISCHARGE INSTRUCTIONS:   Medicines:   · Pain medicine: You may be given a prescription medicine to decrease pain  Do not wait until the pain is severe before you take this medicine  · Take your medicine as directed  Contact your healthcare provider if you think your medicine is not helping or if you have side effects  Tell him or her if you are allergic to any medicine  Keep a list of the medicines, vitamins, and herbs you take  Include the amounts, and when and why you take them  Bring the list or the pill bottles to follow-up visits  Carry your medicine list with you in case of an emergency  Follow up with your healthcare provider as directed:  Write down your questions so you remember to ask them during your visits  Contact your healthcare provider if:   · You have stomach cramps  · You have a fever  · You have questions or concerns about your condition or care  Seek care immediately or call 911 if:   · You have bleeding that does not stop  · You have severe abdominal pain that does not go away, even with medicine  · You feel lightheaded and short of breath  · You have chest pain when you take a deep breath or cough, or you cough up blood  · Your arm or leg feels warm, tender, and painful  It may look swollen and red  © 2017 [employer]  Information is for End User's use only and may not be sold, redistributed or otherwise used for commercial purposes  All illustrations and images included in CareNotes® are the copyrighted property of [employer]  or [employer]  The above information is an  only  It is not intended as medical advice for individual conditions or treatments  Talk to your doctor, nurse or pharmacist before following any medical regimen to see if it is safe and effective for you

## 2022-03-18 NOTE — ANESTHESIA POSTPROCEDURE EVALUATION
Post-Op Assessment Note    CV Status:  Stable  Pain Score: 1    Pain management: adequate     Mental Status:  Alert and awake   Hydration Status:  Euvolemic   PONV Controlled:  Controlled   Airway Patency:  Patent  Airway: intubated      Post Op Vitals Reviewed: Yes      Staff: CRNA         No complications documented      BP  123/60    Temp (!) 96 7 °F (35 9 °C) (03/18/22 1445)    Pulse 91 (03/18/22 1445)   Resp   16   SpO2   100% RA

## 2022-03-18 NOTE — INTERVAL H&P NOTE
H&P reviewed  After examining the patient I find no changes in the patients condition since the H&P had been written  Vitals:    03/18/22 1136   BP: 119/68   Pulse: 66   Resp: 18   Temp: 98 4 °F (36 9 °C)   SpO2: 98%     Reviewed surgical expectations and recovery  Reviewed options for preventing future cyst formation  All questions answered to the best of my ability  Rachell Pennington MD  OB/GYN  3/18/2022  12:27 PM

## 2022-03-18 NOTE — OP NOTE
OPERATIVE REPORT  PATIENT NAME: John Edwards    :  2000  MRN: [id number]  Pt Location: [facility] OR ROOM 03    SURGERY DATE: 3/18/2022    Surgeon(s) and Role:     * Ty Nickerson MD - Primary     * Catie Up MD - Assisting    Preop Diagnosis:  Left ovarian cyst [N83 202]    Post-Op Diagnosis Codes:     * Left ovarian cyst [N83 202]    Procedure(s) (LRB):  LAPAROSCOPIC REMOVAL OF LEFT OVARIAN CYST (Left)    Specimen(s):  ID Type Source Tests Collected by Time Destination   1 : Left Ovarian Cyst  Tissue Ovary, Cyst TISSUE EXAM Ty Nickerson MD 3/18/2022 1426        Estimated Blood Loss:   5 mL    UOP: 100cc    Anesthesia Type:   General ET    Operative Indications:  Left ovarian cyst [N83 202]    Operative Findings:  1  External genitalia grossly normal in appearance  No ulcerations, no lacerations, no lesions  Bimanual exam revealed small anteverted uterus with normal contours and freely mobile  No adnexal masses palpated bilaterally  2   Vagina and cervix were grossly normal in appearance without any lacerations or lesions  3  Uterus sounded to 7 cm  4   Laparoscopic evaluation revealed no injury to bowel or bladder vasculature upon entrance  Uterus was small, anteverted with normal contours  Adhesions were present from the right ovary to the omentum and from the omentum to the right uterosacral ligament and culdesac  Right ovary and fallopian tube were grossly normal  Normal portion of left ovary was visualized but ovary was distorted by large left ovarian simple cyst  Clear fluid suctioned from cyst  Left fallopian tube was adhered to the anterior surface of the cyst wall but was subsequently normal after cystectomy  5   Upper abdominal organs were visualized and grossly normal in appearance  These include liver, stomach, appendix and omentum  Filmy omental adhesions around the appendix       Complications:   None    Procedure and Technique:    Description of Procedure    Patient was taken to the operating room were a time out was performed to confirm correct patient and correct procedure  General endotracheal anesthesia (GET) was administered and the patient was positioned on the OR table in the dorsal lithotomy position  All pressure points were padded  A bimanual exam was performed and the uterus was noted to be anteverted, normal in size and consistency  Fullness in the posterior culdesac was noted but mass was mobile  The patient was prepped and draped in the usual sterile fashion with chloroprep on the abdomen and chlorhexidine prep on the vagina and perineum  Operative Technique    A straight catheter was introduced into the bladder, which was drained of 100cc of clear yellow urine  A weighted speculum was inserted into the vagina and a Reji was used to visualize the anterior lip of the cervix, which was then grasped with a single toothed tenaculum  The cervix was serially dilated to [address] [place] and the dilator was secured to the tenaculum for manipulation of the uterus  The speculum was removed from the vagina  Sterile gloves were then exchanged and attention was turned to the abdomen  A 5mm incision was made at the inferior edge of the umbilicus for introduction of a 5mm trocar  Trocar was introduced under direct visualization  Pneumoperitoneum was then established to a maximum of 15mmHg  The entire abdomen and pelvis was inspected and there was no evidence of injury to bowel, bladder, vasculature, or other structures  Attention was then turned to the pelvis  Patient was placed in Trendelenburg and the findings were as noted above  Two additional port sites were selected in the left and right lower abdomen approximately 2cm superior and medial to the iliac crests    A 5mm incision was made for introduction of a 5mm trocar under direct visualization on the left and a 10mm incision was made for introduction of a 10mm trocar under direct visualization on the right  The left fallopian tube was grasped to elevated the cyst out of the pelvis  A small paratubal cyst was truncated from the fallopian tube with the Harmonic and removed from the body  The Harmonic was used to open the ovarian cortex over the cyst  The ovarian cyst was shelled out of the cortex with traction and counter traction  The cyst remained intact and was placed into a specimen removal bag  The specimen removal bag was brought to the anterior abdominal wall and the 10mm port was removed  The cyst was ruptured in the bag and suctioned and the bag with the ovarian cyst wall was removed from the abdomen and sent to pathology  The 10mm port was replaced  Filmy adhesions were noted from the omentum to the right pelvis  The adhesions were dissected with the Harmonic with excellent visualization of the anatomy  The pelvis was irrigated and suctioned  Good hemostasis was confirmed bilaterally  Fascia of the 10mm incision was closed with 0 Vicryl with maintained pneumoperitoneum to confirm adequate closure  Pneumoperitoneum was then allowed to escape  The laparoscope was removed from the abdomen along with the remaining trocars  Skin incisions were closed with 4-0 Monocryl  Attention was turned to the vagina  A weighted speculum was reinserted into the vagina and the dilator was withdrawn  Single toothed tenaculum was removed from the anterior lip of the cervix  Good hemostasis was confirmed at the tenaculum puncture sites  Speculum was then removed from the vagina  At the conclusion of the procedure, all needle, sponge, and instrument counts were noted to be correct x2  Patient tolerated the procedure well and was transferred to PACU in stable condition prior to discharge with follow up in 1-2 weeks  Dr Kathy Mckeon was present and participated in all key portions of the case      Patient Disposition:  PACU       SIGNATURE: Sarah Gastelum MD  DATE: March 18, 2022  TIME: 2:58 PM

## 2022-03-18 NOTE — ANESTHESIA PREPROCEDURE EVALUATION
Procedure:  LAPAROSCOPIC REMOVAL OF LEFT OVARIAN CYST (Left Pelvis)    Relevant Problems   ANESTHESIA (within normal limits)   (-) History of anesthesia complications      CARDIO   (-) WOODARD (dyspnea on exertion)      NEURO/PSYCH   (+) Mild depression (HCC)      Other   (+) Cochlear implant in place   (+) Congenital deafness        Physical Exam    Airway    Mallampati score: I  TM Distance: >3 FB  Neck ROM: full     Dental   No notable dental hx     Cardiovascular      Pulmonary      Other Findings        Anesthesia Plan  ASA Score- 1     Anesthesia Type- general with ASA Monitors  Additional Monitors:   Airway Plan: ETT  Plan Factors-Exercise tolerance (METS): >4 METS  Chart reviewed  Induction- intravenous  Postoperative Plan-     Informed Consent- Anesthetic plan and risks discussed with patient and mother  I personally reviewed this patient with the CRNA  Discussed and agreed on the Anesthesia Plan with the CRNA  Marcel Joyce

## 2022-03-22 ENCOUNTER — TELEPHONE (OUTPATIENT)
Dept: OBGYN CLINIC | Facility: CLINIC | Age: 22
End: 2022-03-22

## 2022-04-06 ENCOUNTER — OFFICE VISIT (OUTPATIENT)
Dept: OBGYN CLINIC | Facility: CLINIC | Age: 22
End: 2022-04-06

## 2022-04-06 VITALS
SYSTOLIC BLOOD PRESSURE: 110 MMHG | DIASTOLIC BLOOD PRESSURE: 68 MMHG | HEIGHT: 65 IN | WEIGHT: 158.4 LBS | BODY MASS INDEX: 26.39 KG/M2

## 2022-04-06 DIAGNOSIS — Z09 POSTOPERATIVE EXAMINATION: Primary | ICD-10-CM

## 2022-04-06 PROCEDURE — 99024 POSTOP FOLLOW-UP VISIT: CPT | Performed by: OBSTETRICS & GYNECOLOGY

## 2022-04-06 NOTE — PROGRESS NOTES
Assessment/Plan:         Diagnoses and all orders for this visit:    Postoperative examination  Comments:  healing well  Back to work  Appetite, bowel bladder normal  Follow up annual 6 months  Subjective:      Patient ID: Milo Márquez is a 25 y o  female  F/u Left ovarian cyst removal  Pathology reviewed  , benign  Incision: right side superficial separation  No evidence of infection  Gynecologic Exam  The patient's pertinent negatives include no genital itching, genital lesions, genital odor, genital rash, pelvic pain, vaginal bleeding or vaginal discharge  Pertinent negatives include no chills, constipation, diarrhea, dysuria, fever, flank pain, frequency, headaches, nausea, sore throat, urgency or vomiting  She is sexually active  Her menstrual history has been regular  The following portions of the patient's history were reviewed and updated as appropriate:   She  has a past medical history of Deaf, bilateral   She   Patient Active Problem List    Diagnosis Date Noted    S/P ovarian cystectomy 03/18/2022    Left ovarian cyst 01/12/2022    Pelvic pain 10/04/2021    Congenital deafness 03/24/2021    Cochlear implant in place 03/24/2021    Mild depression 03/24/2021     She  has a past surgical history that includes Reduction mammaplasty; Cochlear implant (Right); and pr lap,fulgurate/excise lesions (Left, 3/18/2022)  Her family history is not on file  She  reports that she has never smoked  She has never used smokeless tobacco  She reports current alcohol use  She reports previous drug use  Current Outpatient Medications   Medication Sig Dispense Refill    ibuprofen (MOTRIN) 600 mg tablet Take 1 tablet (600 mg total) by mouth every 6 (six) hours as needed for moderate pain 30 tablet 0     No current facility-administered medications for this visit       Current Outpatient Medications on File Prior to Visit   Medication Sig    ibuprofen (MOTRIN) 600 mg tablet Take 1 tablet (600 mg total) by mouth every 6 (six) hours as needed for moderate pain     No current facility-administered medications on file prior to visit  She has No Known Allergies       Review of Systems   Constitutional: Negative for activity change, appetite change, chills, fatigue and fever  HENT: Negative for rhinorrhea, sneezing and sore throat  Eyes: Negative for visual disturbance  Respiratory: Negative for cough, shortness of breath and wheezing  Cardiovascular: Negative for chest pain, palpitations and leg swelling  Gastrointestinal: Negative for abdominal distention, constipation, diarrhea, nausea and vomiting  Genitourinary: Negative for difficulty urinating, dysuria, flank pain, frequency, pelvic pain, urgency and vaginal discharge  Neurological: Negative for syncope, light-headedness and headaches  Objective:      /68   Ht 5' 5" (1 651 m)   Wt 71 8 kg (158 lb 6 4 oz)   BMI 26 36 kg/m²          Physical Exam  Vitals and nursing note reviewed  Abdominal:      General: Bowel sounds are normal  There is no distension  Palpations: Abdomen is soft  Tenderness: There is no abdominal tenderness  There is no guarding or rebound

## 2022-10-05 ENCOUNTER — ANNUAL EXAM (OUTPATIENT)
Dept: OBGYN CLINIC | Facility: CLINIC | Age: 22
End: 2022-10-05
Payer: COMMERCIAL

## 2022-10-05 VITALS
HEIGHT: 65 IN | DIASTOLIC BLOOD PRESSURE: 66 MMHG | BODY MASS INDEX: 26.66 KG/M2 | WEIGHT: 160 LBS | SYSTOLIC BLOOD PRESSURE: 104 MMHG

## 2022-10-05 DIAGNOSIS — Z01.419 ENCOUNTER FOR GYNECOLOGICAL EXAMINATION: Primary | ICD-10-CM

## 2022-10-05 PROBLEM — Z87.42 S/P OVARIAN CYSTECTOMY: Status: RESOLVED | Noted: 2022-03-18 | Resolved: 2022-10-05

## 2022-10-05 PROBLEM — N83.202 LEFT OVARIAN CYST: Status: RESOLVED | Noted: 2022-01-12 | Resolved: 2022-10-05

## 2022-10-05 PROBLEM — R10.2 PELVIC PAIN: Status: RESOLVED | Noted: 2021-10-04 | Resolved: 2022-10-05

## 2022-10-05 PROBLEM — Z98.890 S/P OVARIAN CYSTECTOMY: Status: RESOLVED | Noted: 2022-03-18 | Resolved: 2022-10-05

## 2022-10-05 PROCEDURE — S0612 ANNUAL GYNECOLOGICAL EXAMINA: HCPCS | Performed by: OBSTETRICS & GYNECOLOGY

## 2022-10-05 NOTE — PROGRESS NOTES
Assessment/Plan:    Encounter for gynecological examination  Pap current  STD testing declined    Encourage healthy diet, exercise, Calcium 1200mg per day and at least 800 iu Vitamin D daily  Diagnoses and all orders for this visit:    Encounter for gynecological examination          Subjective:      Patient ID: Jameel Rivera is a 25 y o  female  Patient presents for a routine annual visit  Menarche- 5 Y/O  Last Pap Smear- 10/4/21 neg  LMP- 10/1/22  Birth control- none    Non smoker  Social drinker  Currently not sexually active  Gardasil series complete   No family history of uterine, ovarian, cervical or breast cancer  Hx ovarian cysts and pelvic pain  s/p ovarian cyst removal 3/18/22    No concerns/questions for today's visit  Currently on period  On day 4  No pain  Menses regular  Not sexually active at this time  Gynecologic Exam  She reports no genital itching, genital lesions, genital odor, genital rash, pelvic pain, vaginal bleeding or vaginal discharge  The patient is experiencing no pain  Pertinent negatives include no chills, constipation, diarrhea, dysuria, fever, flank pain, frequency, headaches, nausea, sore throat, urgency or vomiting  The patient's menstrual history has been regular  The following portions of the patient's history were reviewed and updated as appropriate:   She  has a past medical history of Deaf, bilateral   She   Patient Active Problem List    Diagnosis Date Noted    Encounter for gynecological examination 10/05/2022    Congenital deafness 2021    Cochlear implant in place 2021    Mild depression 2021     She  has a past surgical history that includes Reduction mammaplasty; Cochlear implant (Right); and pr lap,fulgurate/excise lesions (Left, 3/18/2022)  Her family history is not on file  She  reports that she has never smoked  She has never used smokeless tobacco  She reports current alcohol use   She reports previous drug use   Current Outpatient Medications   Medication Sig Dispense Refill    ibuprofen (MOTRIN) 600 mg tablet Take 1 tablet (600 mg total) by mouth every 6 (six) hours as needed for moderate pain (Patient not taking: Reported on 10/5/2022) 30 tablet 0     No current facility-administered medications for this visit  Current Outpatient Medications on File Prior to Visit   Medication Sig    ibuprofen (MOTRIN) 600 mg tablet Take 1 tablet (600 mg total) by mouth every 6 (six) hours as needed for moderate pain (Patient not taking: Reported on 10/5/2022)     No current facility-administered medications on file prior to visit  She has No Known Allergies       Review of Systems   Constitutional: Negative for activity change, appetite change, chills, fatigue and fever  HENT: Negative for rhinorrhea, sneezing and sore throat  Eyes: Negative for visual disturbance  Respiratory: Negative for cough, shortness of breath and wheezing  Cardiovascular: Negative for chest pain, palpitations and leg swelling  Gastrointestinal: Negative for abdominal distention, constipation, diarrhea, nausea and vomiting  Genitourinary: Negative for difficulty urinating, dysuria, flank pain, frequency, pelvic pain, urgency and vaginal discharge  Neurological: Negative for syncope, light-headedness and headaches  Objective:      /66 (BP Location: Left arm, Patient Position: Sitting, Cuff Size: Standard)   Ht 5' 5" (1 651 m)   Wt 72 6 kg (160 lb)   LMP 10/01/2022 (Exact Date)   BMI 26 63 kg/m²          Physical Exam  Chest:   Breasts: Breasts are symmetrical       Right: No inverted nipple, mass, nipple discharge, skin change or tenderness  Left: No inverted nipple, mass, nipple discharge, skin change or tenderness  Abdominal:      General: Abdomen is flat  Palpations: Abdomen is soft  Tenderness: There is no abdominal tenderness  There is no guarding or rebound     Genitourinary:     Vagina: Normal    Neurological:      Mental Status: She is alert and oriented to person, place, and time

## 2022-10-05 NOTE — ASSESSMENT & PLAN NOTE
Pap current  STD testing declined    Encourage healthy diet, exercise, Calcium 1200mg per day and at least 800 iu Vitamin D daily

## 2022-10-05 NOTE — PATIENT INSTRUCTIONS

## 2022-12-04 PROBLEM — Z01.419 ENCOUNTER FOR GYNECOLOGICAL EXAMINATION: Status: RESOLVED | Noted: 2022-10-05 | Resolved: 2022-12-04

## 2023-10-09 NOTE — PROGRESS NOTES
Assessment/Plan:    Encounter for gynecological examination  Pap current, due next year  Contraception: condoms    Encourage healthy diet, exercise, Calcium 1200mg per day and at least 800 iu Vitamin D daily. Diagnoses and all orders for this visit:    Encounter for gynecological examination          Subjective:      Patient ID: Lauren Roque is a 21 y.o. female. Patient presents for a routine annual visit  Menarche-10 Y/O  Last Pap Smear- 10/4/21 neg  LMP-23  Birth control-condoms    Non smoker  Social drinker  Currently sexually active  Gardasil series completed  No family history of uterine, ovarian, cervical or breast cancer    No concerns/questions for today's visit  Now single. Declines STD testing  Planning a trip in February to [place] with friends to celebrate her birthday. 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 Gynecologic Exam  She reports no genital itching, genital lesions, genital odor, genital rash, pelvic pain, vaginal bleeding or vaginal discharge. The patient is experiencing no pain. Pertinent negatives include no chills, constipation, diarrhea, fever, nausea, sore throat or vomiting. The following portions of the patient's history were reviewed and updated as appropriate:   She  has a past medical history of Deaf, bilateral.  She   Patient Active Problem List    Diagnosis Date Noted   • Encounter for gynecological examination 10/05/2022   • Congenital deafness 2021   • Cochlear implant in place 2021   • Mild depression 2021     She  has a past surgical history that includes Reduction mammaplasty; Cochlear implant (Right); and pr laps fulg/exc ovary viscera/peritoneal surface (Left, 3/18/2022). Her family history is not on file. She  reports that she has never smoked. She has never used smokeless tobacco. She reports current alcohol use. She reports that she does not use drugs.   Current Outpatient Medications   Medication Sig Dispense Refill   • ibuprofen (MOTRIN) 600 mg tablet Take 1 tablet (600 mg total) by mouth every 6 (six) hours as needed for moderate pain (Patient not taking: Reported on 10/5/2022) 30 tablet 0     No current facility-administered medications for this visit. Current Outpatient Medications on File Prior to Visit   Medication Sig   • ibuprofen (MOTRIN) 600 mg tablet Take 1 tablet (600 mg total) by mouth every 6 (six) hours as needed for moderate pain (Patient not taking: Reported on 10/5/2022)     No current facility-administered medications on file prior to visit. She has No Known Allergies. .    Review of Systems   Constitutional: Negative for activity change, appetite change, chills, fatigue and fever. HENT: Negative for rhinorrhea, sneezing and sore throat. Eyes: Negative for visual disturbance. Respiratory: Negative for cough, shortness of breath and wheezing. Cardiovascular: Negative for chest pain, palpitations and leg swelling. Gastrointestinal: Negative for abdominal distention, constipation, diarrhea, nausea and vomiting. Genitourinary: Negative for difficulty urinating, pelvic pain and vaginal discharge. Neurological: Negative for syncope and light-headedness. Objective:      /62 (BP Location: Left arm, Patient Position: Sitting, Cuff Size: Standard)   Ht 5' 3.75" (1.619 m)   Wt 73 kg (161 lb)   LMP 09/24/2023   BMI 27.85 kg/m²          Physical Exam  Chest:   Breasts:     Breasts are symmetrical.      Right: No inverted nipple, mass, nipple discharge, skin change or tenderness. Left: No inverted nipple, mass, nipple discharge, skin change or tenderness. Genitourinary:     Labia:         Right: No rash, tenderness, lesion or injury. Left: No rash, tenderness, lesion or injury. Vagina: Normal. No vaginal discharge, tenderness or bleeding. Cervix: No cervical motion tenderness, discharge or friability. Uterus: Not deviated, not enlarged, not fixed and not tender. Adnexa:         Right: No mass, tenderness or fullness. Left: No mass, tenderness or fullness. Neurological:      Mental Status: She is alert and oriented to person, place, and time.

## 2023-10-10 ENCOUNTER — ANNUAL EXAM (OUTPATIENT)
Dept: OBGYN CLINIC | Facility: CLINIC | Age: 23
End: 2023-10-10
Payer: COMMERCIAL

## 2023-10-10 VITALS
WEIGHT: 161 LBS | SYSTOLIC BLOOD PRESSURE: 100 MMHG | HEIGHT: 64 IN | DIASTOLIC BLOOD PRESSURE: 62 MMHG | BODY MASS INDEX: 27.49 KG/M2

## 2023-10-10 DIAGNOSIS — Z01.419 ENCOUNTER FOR GYNECOLOGICAL EXAMINATION: Primary | ICD-10-CM

## 2023-10-10 PROCEDURE — S0612 ANNUAL GYNECOLOGICAL EXAMINA: HCPCS | Performed by: OBSTETRICS & GYNECOLOGY

## 2023-10-10 NOTE — ASSESSMENT & PLAN NOTE
Pap current, due next year  Contraception: condoms    Encourage healthy diet, exercise, Calcium 1200mg per day and at least 800 iu Vitamin D daily.

## 2023-10-10 NOTE — PATIENT INSTRUCTIONS
Wellness Visit for Adults   WHAT YOU NEED TO KNOW:   What is a wellness visit? A wellness visit is when you see your healthcare provider to get screened for health problems. Your healthcare provider will also give you advice on how to stay healthy. Write down your questions so you remember to ask them. Ask your healthcare provider how often you should have a wellness visit. What happens at a wellness visit? Your healthcare provider will ask about your health, and your family history of health problems. This includes high blood pressure, heart disease, and cancer. He or she will ask if you have symptoms that concern you, if you smoke, and about your mood. You may also be asked about your intake of medicines, supplements, food, and alcohol. Any of the following may be done: Your weight  will be checked. Your height may also be checked so your body mass index (BMI) can be calculated. Your BMI shows if you are at a healthy weight. Your blood pressure  and heart rate will be checked. Your temperature may also be checked. Blood and urine tests  may be done. Blood tests may be done to check your cholesterol levels. Abnormal cholesterol levels increase your risk for heart disease and stroke. You may also need a blood or urine test to check for diabetes if you are at increased risk. Urine tests may be done to look for signs of an infection or kidney disease. A physical exam  includes checking your heartbeat and lungs with a stethoscope. Your healthcare provider may also check your skin to look for sun damage. Screening tests  may be recommended. A screening test is done to check for diseases that may not cause symptoms. The screening tests you may need depend on your age, gender, family history, and lifestyle habits. For example, colorectal screening may be recommended if you are 48years old or older. What screening tests do I need if I am a woman? A Pap smear  is used to screen for cervical cancer.  Pap smears are usually done every 3 to 5 years depending on your age. You may need them more often if you have had abnormal Pap smear test results in the past. Ask your healthcare provider how often you should have a Pap smear. A mammogram  is an x-ray of your breasts to screen for breast cancer. Experts recommend mammograms every 2 years starting at age 48 years. You may need a mammogram at age 52 years or younger if you have an increased risk for breast cancer. Talk to your healthcare provider about when you should start having mammograms and how often you need them. What vaccines might I need? Get an influenza vaccine  every year. The influenza vaccine protects you from the flu. Several types of viruses cause the flu. The viruses change over time, so new vaccines are made each year. Get a tetanus-diphtheria (Td) booster vaccine  every 10 years. This vaccine protects you against tetanus and diphtheria. Tetanus is a severe infection that may cause painful muscle spasms and lockjaw. Diphtheria is a severe bacterial infection that causes a thick covering in the back of your mouth and throat. Get a human papillomavirus (HPV) vaccine  if you are female and aged 23 to 32 or male 23 to 24 and never received it. This vaccine protects you from HPV infection. HPV is the most common infection spread by sexual contact. HPV may also cause vaginal, penile, and anal cancers. Get a pneumococcal vaccine  if you are aged 72 years or older. The pneumococcal vaccine is an injection given to protect you from pneumococcal disease. Pneumococcal disease is an infection caused by pneumococcal bacteria. The infection may cause pneumonia, meningitis, or an ear infection. Get a shingles vaccine  if you are 60 or older, even if you have had shingles before. The shingles vaccine is an injection to protect you from the varicella-zoster virus. This is the same virus that causes chickenpox.  Shingles is a painful rash that develops in people who had chickenpox or have been exposed to the virus. How can I eat healthy? My Plate is a model for planning healthy meals. It shows the types and amounts of foods that should go on your plate. Fruits and vegetables make up about half of your plate, and grains and protein make up the other half. A serving of dairy is included on the side of your plate. The amount of calories and serving sizes you need depends on your age, gender, weight, and height. Examples of healthy foods are listed below:  Eat a variety of vegetables  such as dark green, red, and orange vegetables. You can also include canned vegetables low in sodium (salt) and frozen vegetables without added butter or sauces. Eat a variety of fresh fruits , canned fruit in 100% juice, frozen fruit, and dried fruit. Include whole grains. At least half of the grains you eat should be whole grains. Examples include whole-wheat bread, wheat pasta, brown rice, and whole-grain cereals such as oatmeal.    Eat a variety of protein foods such as seafood (fish and shellfish), lean meat, and poultry without skin (turkey and chicken). Examples of lean meats include pork leg, shoulder, or tenderloin, and beef round, sirloin, tenderloin, and extra lean ground beef. Other protein foods include eggs and egg substitutes, beans, peas, soy products, nuts, and seeds. Choose low-fat dairy products such as skim or 1% milk or low-fat yogurt, cheese, and cottage cheese. Limit unhealthy fats  such as butter, hard margarine, and shortening. How much exercise do I need? Exercise at least 30 minutes per day on most days of the week. Some examples of exercise include walking, biking, dancing, and swimming. You can also fit in more physical activity by taking the stairs instead of the elevator or parking farther away from stores. Include muscle strengthening activities 2 days each week. Regular exercise provides many health benefits.  It helps you manage your weight, and decreases your risk for type 2 diabetes, heart disease, stroke, and high blood pressure. Exercise can also help improve your mood. Ask your healthcare provider about the best exercise plan for you. What are some general health and safety guidelines I should follow? Do not smoke. Nicotine and other chemicals in cigarettes and cigars can cause lung damage. Ask your healthcare provider for information if you currently smoke and need help to quit. E-cigarettes or smokeless tobacco still contain nicotine. Talk to your healthcare provider before you use these products. Limit alcohol. A drink of alcohol is 12 ounces of beer, 5 ounces of wine, or 1½ ounces of liquor. Lose weight, if needed. Being overweight increases your risk of certain health conditions. These include heart disease, high blood pressure, type 2 diabetes, and certain types of cancer. Protect your skin. Do not sunbathe or use tanning beds. Use sunscreen with a SPF 15 or higher. Apply sunscreen at least 15 minutes before you go outside. Reapply sunscreen every 2 hours. Wear protective clothing, hats, and sunglasses when you are outside. Drive safely. Always wear your seatbelt. Make sure everyone in your car wears a seatbelt. A seatbelt can save your life if you are in an accident. Do not use your cell phone when you are driving. This could distract you and cause an accident. Pull over if you need to make a call or send a text message. Practice safe sex. Use latex condoms if are sexually active and have more than one partner. Your healthcare provider may recommend screening tests for sexually transmitted infections (STIs). Wear helmets, lifejackets, and protective gear. Always wear a helmet when you ride a bike or motorcycle, go skiing, or play sports that could cause a head injury. Wear protective equipment when you play sports. Wear a lifejacket when you are on a boat or doing water sports.     CARE AGREEMENT: You have the right to help plan your care. Learn about your health condition and how it may be treated. Discuss treatment options with your healthcare providers to decide what care you want to receive. You always have the right to refuse treatment. The above information is an  only. It is not intended as medical advice for individual conditions or treatments. Talk to your doctor, nurse or pharmacist before following any medical regimen to see if it is safe and effective for you. © Copyright [employer] 2023 Information is for End User's use only and may not be sold, redistributed or otherwise used for commercial purposes.

## 2023-12-09 PROBLEM — Z01.419 ENCOUNTER FOR GYNECOLOGICAL EXAMINATION: Status: RESOLVED | Noted: 2022-10-05 | Resolved: 2023-12-09

## 2024-07-10 ENCOUNTER — HOSPITAL ENCOUNTER (EMERGENCY)
Facility: HOSPITAL | Age: 24
Discharge: HOME | End: 2024-07-10
Attending: EMERGENCY MEDICINE
Payer: COMMERCIAL

## 2024-07-10 VITALS
SYSTOLIC BLOOD PRESSURE: 141 MMHG | RESPIRATION RATE: 18 BRPM | OXYGEN SATURATION: 96 % | TEMPERATURE: 99.3 F | DIASTOLIC BLOOD PRESSURE: 89 MMHG | HEART RATE: 96 BPM

## 2024-07-10 DIAGNOSIS — S01.81XA LACERATION OF FOREHEAD, INITIAL ENCOUNTER: ICD-10-CM
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
## 2024-07-10 PROCEDURE — 12011 RPR F/E/E/N/L/M 2.5 CM/<: CPT | Performed by: EMERGENCY MEDICINE

## 2024-07-10 PROCEDURE — 99284 EMERGENCY DEPT VISIT MOD MDM: CPT | Performed by: EMERGENCY MEDICINE

## 2024-07-10 PROCEDURE — 99283 EMERGENCY DEPT VISIT LOW MDM: CPT

## 2024-07-10 RX ORDER — AMOXICILLIN AND CLAVULANATE POTASSIUM 875; 125 MG/1; MG/1
1 TABLET, FILM COATED ORAL EVERY 12 HOURS
Qty: 14 TABLET | Refills: 0 | Status: SHIPPED | OUTPATIENT
Start: 2024-07-10 | End: 2024-07-18

## 2024-07-10 RX ORDER — LIDOCAINE HCL/EPINEPHRINE/PF 2%-1:200K
10 VIAL (ML) INJECTION ONCE
Status: COMPLETED | OUTPATIENT
Start: 2024-07-10 | End: 2024-07-10

## 2024-07-10 RX ORDER — AMOXICILLIN AND CLAVULANATE POTASSIUM 875; 125 MG/1; MG/1
1 TABLET, FILM COATED ORAL ONCE
Status: COMPLETED | OUTPATIENT
Start: 2024-07-10 | End: 2024-07-10

## 2024-07-10 RX ADMIN — Medication 1 APPLICATION: at 20:39

## 2024-07-10 RX ADMIN — AMOXICILLIN AND CLAVULANATE POTASSIUM 1 TABLET: 875; 125 TABLET, FILM COATED ORAL at 20:43

## 2024-07-10 RX ADMIN — LIDOCAINE HYDROCHLORIDE,EPINEPHRINE BITARTRATE 10 ML: 20; .005 INJECTION, SOLUTION EPIDURAL; INFILTRATION; INTRACAUDAL; PERINEURAL at 20:39

## 2024-07-10 NOTE — Clinical Note
Neisha Bowles was seen and treated in our emergency department on 7/10/2024.                Diagnosis:     Neisha  .    She may return on this date: 07/12/2024         If you have any questions or concerns, please don't hesitate to call.      Liam Diaz RN    ______________________________           _______________          _______________  Hospital Representative                              Date                                Time

## 2024-07-11 NOTE — ED PROVIDER NOTES
History  Chief Complaint   Patient presents with    Dog Bite     Was bit by dog on forehead, dog is UTD on vaccines      Bit in forehead just prior to arrival by friend's dog whose shots are UTD. Laceration to forehead. C/o blurry vision from L eye but no eye pain or photophobia. Nkda.         Prior to Admission Medications   Prescriptions Last Dose Informant Patient Reported? Taking?   ibuprofen (MOTRIN) 600 mg tablet  Self No No   Sig: Take 1 tablet (600 mg total) by mouth every 6 (six) hours as needed for moderate pain   Patient not taking: Reported on 10/5/2022      Facility-Administered Medications: None       Past Medical History:   Diagnosis Date    Deaf, bilateral        Past Surgical History:   Procedure Laterality Date    COCHLEAR IMPLANT Right     AK LAPS FULG/EXC OVARY VISCERA/PERITONEAL SURFACE Left 3/18/2022    Procedure: LAPAROSCOPIC REMOVAL OF LEFT OVARIAN CYST;  Surgeon: Zainab Thomson MD;  Location: [facility] OR;  Service: Gynecology    REDUCTION MAMMAPLASTY         Family History   Problem Relation Age of Onset    Breast cancer Neg Hx     Ovarian cancer Neg Hx     Uterine cancer Neg Hx     Cervical cancer Neg Hx      I have reviewed and agree with the history as documented.    E-Cigarette/Vaping    E-Cigarette Use Never User      E-Cigarette/Vaping Substances    Nicotine No     THC No     CBD No     Flavoring No     Other No     Unknown No      Social History     Tobacco Use    Smoking status: Never    Smokeless tobacco: Never   Vaping Use    Vaping status: Never Used   Substance Use Topics    Alcohol use: Yes     Comment: wine-socially    Drug use: Never       Review of Systems   Eyes:  Positive for visual disturbance. Negative for photophobia, pain, discharge, redness and itching.   Skin:  Positive for wound.   Neurological:  Negative for dizziness and headaches.       Physical Exam  Physical Exam  Vitals and nursing note reviewed.   Constitutional:       General: She is not in acute  distress.     Appearance: She is well-developed. She is not diaphoretic.   HENT:      Head: Normocephalic and atraumatic.   Eyes:      General:         Right eye: No discharge.         Left eye: No discharge.      Extraocular Movements: EOM normal.      Conjunctiva/sclera: Conjunctivae normal.      Pupils: Pupils are equal, round, and reactive to light.      Comments: Mild swelling L lower eyelid. No lacaration. No proptosis or chemosis.    Neck:      Vascular: No JVD.   Pulmonary:      Breath sounds: No stridor.   Musculoskeletal:         General: No tenderness, deformity or edema. Normal range of motion.      Cervical back: Normal range of motion and neck supple.      Comments: 2cm linear laceration forehead. No bleeding. No evidence of infection.    Skin:     General: Skin is warm and dry.      Capillary Refill: Capillary refill takes less than 2 seconds.   Neurological:      Mental Status: She is alert and oriented to person, place, and time.      Cranial Nerves: No cranial nerve deficit.      Sensory: No sensory deficit.      Motor: No abnormal muscle tone.      Coordination: Coordination normal.         Vital Signs  ED Triage Vitals [07/10/24 2013]   Temperature Pulse Respirations Blood Pressure SpO2   99.3 °F (37.4 °C) 96 18 141/89 96 %      Temp src Heart Rate Source Patient Position - Orthostatic VS BP Location FiO2 (%)   -- Monitor -- -- --      Pain Score       --           Vitals:    07/10/24 2013   BP: 141/89   Pulse: 96         Visual Acuity      ED Medications  Medications   LET gel 1 Application (1 Application Topical Given 7/10/24 2039)   lidocaine-epinephrine (XYLOCAINE-MPF/EPINEPHRINE) 2 %-1:200,000 injection 10 mL (10 mL Infiltration Given by Other 7/10/24 2039)   amoxicillin-clavulanate (AUGMENTIN) 875-125 mg per tablet 1 tablet (1 tablet Oral Given 7/10/24 2043)       Diagnostic Studies  Results Reviewed       None                   No orders to display              Procedures  Universal  Protocol:  Consent: Verbal consent obtained.  Risks and benefits: risks, benefits and alternatives were discussed  Consent given by: patient  Laceration repair    Date/Time: 7/10/2024 9:20 PM    Performed by: Sergio Siegel MD  Authorized by: Sergio Siegel MD  Body area: head/neck  Laceration length: 2 cm  Foreign bodies: no foreign bodies    Anesthesia:  Local Anesthetic: LET (lido, epi, tetracaine)    Wound Dehiscence:  Superficial Wound Dehiscence: simple closure      Procedure Details:  Irrigation solution: saline  Irrigation method: syringe  Amount of cleaning: standard  Wound skin closure material used: 7-0 nylon sutures.  Number of sutures: 4  Approximation: close  Approximation difficulty: simple  Dressing: 4x4 sterile gauze               ED Course                                 SBIRT 22yo+      Flowsheet Row Most Recent Value   Initial Alcohol Screen: US AUDIT-C     1. How often do you have a drink containing alcohol? 0 Filed at: 07/10/2024 2024   2. How many drinks containing alcohol do you have on a typical day you are drinking?  0 Filed at: 07/10/2024 2024   3a. Male UNDER 65: How often do you have five or more drinks on one occasion? 0 Filed at: 07/10/2024 2024   3b. FEMALE Any Age, or MALE 65+: How often do you have 4 or more drinks on one occassion? 0 Filed at: 07/10/2024 2024   Audit-C Score 0 Filed at: 07/10/2024 2024   MAURICIO: How many times in the past year have you...    Used an illegal drug or used a prescription medication for non-medical reasons? Never Filed at: 07/10/2024 2024                      Medical Decision Making  Problems Addressed:  Dog bite, initial encounter:     Details: At risk for infection.     Risk  Prescription drug management.                 Disposition  Final diagnoses:   Dog bite, initial encounter   Laceration of forehead, initial encounter     Time reflects when diagnosis was documented in both MDM as applicable and the Disposition within this note       Time User  Action Codes Description Comment    7/10/2024  9:16 PM Sergio Siegel [W54.0XXA] Dog bite, initial encounter     7/10/2024  9:16 PM Sergio Siegel [S01.81XA] Laceration of forehead, initial encounter           ED Disposition       ED Disposition   Discharge    Condition   Stable    Date/Time   Wed Jul 10, 2024 2116    Comment   Neisha Bowles discharge to home/self care.                   Follow-up Information    None         Patient's Medications   Discharge Prescriptions    AMOXICILLIN-CLAVULANATE (AUGMENTIN) 875-125 MG PER TABLET    Take 1 tablet by mouth every 12 (twelve) hours for 7 days       Start Date: 7/10/2024 End Date: 7/17/2024       Order Dose: 1 tablet       Quantity: 14 tablet    Refills: 0       No discharge procedures on file.    PDMP Review         Value Time User    PDMP Reviewed  Yes 3/18/2022  2:44 PM Zainab Thomson MD            ED Provider  Electronically Signed by             Sergio Siegel MD  07/10/24 1354

## 2024-07-15 ENCOUNTER — OFFICE VISIT (OUTPATIENT)
Age: 24
End: 2024-07-15
Payer: COMMERCIAL

## 2024-07-15 ENCOUNTER — APPOINTMENT (OUTPATIENT)
Age: 24
End: 2024-07-15
Payer: COMMERCIAL

## 2024-07-15 VITALS
RESPIRATION RATE: 18 BRPM | BODY MASS INDEX: 27.85 KG/M2 | SYSTOLIC BLOOD PRESSURE: 127 MMHG | HEART RATE: 106 BPM | DIASTOLIC BLOOD PRESSURE: 77 MMHG | TEMPERATURE: 97 F | OXYGEN SATURATION: 98 % | WEIGHT: 161 LBS

## 2024-07-15 DIAGNOSIS — Z48.02 ENCOUNTER FOR REMOVAL OF SUTURES: Primary | ICD-10-CM

## 2024-07-15 PROCEDURE — 99213 OFFICE O/P EST LOW 20 MIN: CPT | Performed by: NURSE PRACTITIONER

## 2024-07-15 NOTE — PATIENT INSTRUCTIONS
Keep scar out of the sun  Apply vitamin E products to scar in 48 hrs   Monitor for signs of infection   F/u with PCP in the next 1-2 weeks

## 2024-07-15 NOTE — PROGRESS NOTES
Assessment/Plan    Encounter for removal of sutures [Z48.02]  1. Encounter for removal of sutures            4 sutures removed from left upper forehead area  Keep scar out of the sun  Apply vitamin E products to scar in 48 hrs   Monitor for signs of infection   F/u with PCP in the next 1-2 weeks     Patient ID: Neisha Bowles is a 24 y.o. female.      Reason For Visit / Chief Complaint  Chief Complaint   Patient presents with    Suture / Staple Removal         25 y/o female with dog bite to the forehead on 7/10, had sutures placed on the upper left side of her forehead at [facility]. Presents to  for suture removal.      Suture / Staple Removal  The sutures were placed 3 to 6 days ago. She tried oral antibiotics since the wound repair. The treatment provided significant relief. There has been no drainage from the wound. There is no redness present. There is no swelling present. There is no pain present.       Past Medical History:   Diagnosis Date    Deaf, bilateral        Past Surgical History:   Procedure Laterality Date    COCHLEAR IMPLANT Right     FL LAPS FULG/EXC OVARY VISCERA/PERITONEAL SURFACE Left 3/18/2022    Procedure: LAPAROSCOPIC REMOVAL OF LEFT OVARIAN CYST;  Surgeon: Zainab Thomson MD;  Location: [facility];  Service: Gynecology    REDUCTION MAMMAPLASTY         Family History   Problem Relation Age of Onset    Breast cancer Neg Hx     Ovarian cancer Neg Hx     Uterine cancer Neg Hx     Cervical cancer Neg Hx        Review of Systems   Constitutional: Negative.    HENT: Negative.     Eyes: Negative.    Respiratory: Negative.     Cardiovascular: Negative.    Gastrointestinal: Negative.    Endocrine: Negative.    Genitourinary: Negative.    Musculoskeletal: Negative.    Skin:  Positive for wound.   Allergic/Immunologic: Negative.    Neurological: Negative.    Hematological: Negative.    Psychiatric/Behavioral: Negative.         Objective:    /77   Pulse (!) 106   Temp (!) 97 °F  (36.1 °C)   Resp 18   Wt 73 kg (161 lb)   SpO2 98%   BMI 27.85 kg/m²     Physical Exam  Constitutional:       Appearance: Normal appearance.   HENT:      Head: Normocephalic.        Comments: 4 sutures over well healed 3 cm laceration on forehead noted as above     Right Ear: External ear normal.      Left Ear: External ear normal.      Mouth/Throat:      Mouth: Mucous membranes are moist.      Pharynx: Oropharynx is clear.   Eyes:      Extraocular Movements: Extraocular movements intact.      Pupils: Pupils are equal, round, and reactive to light.   Cardiovascular:      Rate and Rhythm: Normal rate and regular rhythm.      Pulses: Normal pulses.      Heart sounds: Normal heart sounds.   Pulmonary:      Effort: Pulmonary effort is normal.      Breath sounds: Normal breath sounds.   Abdominal:      General: Abdomen is flat.      Palpations: Abdomen is soft.   Musculoskeletal:         General: Normal range of motion.      Cervical back: Normal range of motion.   Skin:     General: Skin is warm and dry.      Capillary Refill: Capillary refill takes less than 2 seconds.   Neurological:      General: No focal deficit present.      Mental Status: She is alert.   Psychiatric:         Behavior: Behavior normal.         Thought Content: Thought content normal.         Suture removal    Date/Time: 7/15/2024 10:00 AM    Performed by: DANYA Merida  Authorized by: DANYA Merida  Universal Protocol:  Consent: Verbal consent obtained.  Risks and benefits: risks, benefits and alternatives were discussed  Consent given by: patient  Patient understanding: patient states understanding of the procedure being performed  Patient consent: the patient's understanding of the procedure matches consent given  Procedure consent: procedure consent matches procedure scheduled  Relevant documents: relevant documents present and verified  Test results: test results available and properly labeled  Site marked: the  operative site was marked  Radiology Images displayed and confirmed. If images not available, report reviewed: imaging studies available  Required items: required blood products, implants, devices, and special equipment available  Patient identity confirmed: verbally with patient      Patient location:  Clinic  Location:     Laterality:  Left    Location:  Head/neck    Head/neck location:  Forehead  Procedure details:     Tools used:  Suture removal kit    Wound appearance:  No sign(s) of infection, clean, good wound healing and pink    Number of sutures removed:  4  Post-procedure details:     Post-removal:  No dressing applied    Patient tolerance of procedure:  Tolerated well, no immediate complications

## 2024-07-15 NOTE — LETTER
July 15, 2024     Patient: Neisha Bowles   YOB: 2000   Date of Visit: 7/15/2024       To Whom It May Concern:    It is my medical opinion that Neisha Bowles may return to work on 07/16/2024 .    If you have any questions or concerns, please don't hesitate to call.         Sincerely,        DANYA Merida

## 2024-07-16 ENCOUNTER — HOSPITAL ENCOUNTER (EMERGENCY)
Facility: HOSPITAL | Age: 24
Discharge: HOME | End: 2024-07-16
Attending: EMERGENCY MEDICINE
Payer: COMMERCIAL

## 2024-07-16 ENCOUNTER — APPOINTMENT (EMERGENCY)
Dept: CT IMAGING | Facility: HOSPITAL | Age: 24
End: 2024-07-16
Payer: COMMERCIAL

## 2024-07-16 ENCOUNTER — APPOINTMENT (EMERGENCY)
Dept: RADIOLOGY | Facility: HOSPITAL | Age: 24
End: 2024-07-16
Payer: COMMERCIAL

## 2024-07-16 VITALS
RESPIRATION RATE: 18 BRPM | TEMPERATURE: 97.8 F | OXYGEN SATURATION: 100 % | SYSTOLIC BLOOD PRESSURE: 104 MMHG | DIASTOLIC BLOOD PRESSURE: 60 MMHG | HEART RATE: 91 BPM

## 2024-07-16 DIAGNOSIS — R19.7 DIARRHEA: ICD-10-CM

## 2024-07-16 DIAGNOSIS — K52.9 COLITIS: Primary | ICD-10-CM

## 2024-07-16 LAB
ALBUMIN SERPL BCG-MCNC: 4.2 G/DL (ref 3.5–5)
ALP SERPL-CCNC: 52 U/L (ref 34–104)
ALT SERPL W P-5'-P-CCNC: 12 U/L (ref 7–52)
ANION GAP SERPL CALCULATED.3IONS-SCNC: 9 MMOL/L (ref 4–13)
APTT PPP: 30 SECONDS (ref 23–37)
AST SERPL W P-5'-P-CCNC: 12 U/L (ref 13–39)
BACTERIA UR QL AUTO: ABNORMAL /HPF
BASOPHILS # BLD AUTO: 0.03 THOUSANDS/ÂΜL (ref 0–0.1)
BASOPHILS NFR BLD AUTO: 0 % (ref 0–1)
BILIRUB SERPL-MCNC: 0.56 MG/DL (ref 0.2–1)
BILIRUB UR QL STRIP: NEGATIVE
BUN SERPL-MCNC: 6 MG/DL (ref 5–25)
CALCIUM SERPL-MCNC: 9.3 MG/DL (ref 8.4–10.2)
CHLORIDE SERPL-SCNC: 101 MMOL/L (ref 96–108)
CLARITY UR: ABNORMAL
CO2 SERPL-SCNC: 26 MMOL/L (ref 21–32)
COLOR UR: YELLOW
CREAT SERPL-MCNC: 0.77 MG/DL (ref 0.6–1.3)
EOSINOPHIL # BLD AUTO: 0.01 THOUSAND/ÂΜL (ref 0–0.61)
EOSINOPHIL NFR BLD AUTO: 0 % (ref 0–6)
ERYTHROCYTE [DISTWIDTH] IN BLOOD BY AUTOMATED COUNT: 11.4 % (ref 11.6–15.1)
EXT PREGNANCY TEST URINE: NEGATIVE
EXT. CONTROL: NORMAL
FLUAV RNA RESP QL NAA+PROBE: NEGATIVE
FLUBV RNA RESP QL NAA+PROBE: NEGATIVE
GFR SERPL CREATININE-BSD FRML MDRD: 108 ML/MIN/1.73SQ M
GLUCOSE SERPL-MCNC: 102 MG/DL (ref 65–140)
GLUCOSE UR STRIP-MCNC: NEGATIVE MG/DL
HCT VFR BLD AUTO: 41.7 % (ref 34.8–46.1)
HGB BLD-MCNC: 14.1 G/DL (ref 11.5–15.4)
HGB UR QL STRIP.AUTO: ABNORMAL
IMM GRANULOCYTES # BLD AUTO: 0.04 THOUSAND/UL (ref 0–0.2)
IMM GRANULOCYTES NFR BLD AUTO: 0 % (ref 0–2)
INR PPP: 1.12 (ref 0.84–1.19)
KETONES UR STRIP-MCNC: ABNORMAL MG/DL
LEUKOCYTE ESTERASE UR QL STRIP: NEGATIVE
LYMPHOCYTES # BLD AUTO: 1.03 THOUSANDS/ÂΜL (ref 0.6–4.47)
LYMPHOCYTES NFR BLD AUTO: 9 % (ref 14–44)
MCH RBC QN AUTO: 29.3 PG (ref 26.8–34.3)
MCHC RBC AUTO-ENTMCNC: 33.8 G/DL (ref 31.4–37.4)
MCV RBC AUTO: 87 FL (ref 82–98)
MONOCYTES # BLD AUTO: 0.89 THOUSAND/ÂΜL (ref 0.17–1.22)
MONOCYTES NFR BLD AUTO: 8 % (ref 4–12)
MUCOUS THREADS UR QL AUTO: ABNORMAL
NEUTROPHILS # BLD AUTO: 9.36 THOUSANDS/ÂΜL (ref 1.85–7.62)
NEUTS SEG NFR BLD AUTO: 83 % (ref 43–75)
NITRITE UR QL STRIP: NEGATIVE
NON-SQ EPI CELLS URNS QL MICRO: ABNORMAL /HPF
NRBC BLD AUTO-RTO: 0 /100 WBCS
PH UR STRIP.AUTO: 6 [PH]
PLATELET # BLD AUTO: 265 THOUSANDS/UL (ref 149–390)
PMV BLD AUTO: 9 FL (ref 8.9–12.7)
POTASSIUM SERPL-SCNC: 3.8 MMOL/L (ref 3.5–5.3)
PROT SERPL-MCNC: 7.6 G/DL (ref 6.4–8.4)
PROT UR STRIP-MCNC: ABNORMAL MG/DL
PROTHROMBIN TIME: 15 SECONDS (ref 11.6–14.5)
RBC # BLD AUTO: 4.82 MILLION/UL (ref 3.81–5.12)
RBC #/AREA URNS AUTO: ABNORMAL /HPF
RSV RNA RESP QL NAA+PROBE: NEGATIVE
S PYO DNA THROAT QL NAA+PROBE: NOT DETECTED
SARS-COV-2 RNA RESP QL NAA+PROBE: NEGATIVE
SODIUM SERPL-SCNC: 136 MMOL/L (ref 135–147)
SP GR UR STRIP.AUTO: 1.03 (ref 1–1.03)
UROBILINOGEN UR STRIP-ACNC: <2 MG/DL
WBC # BLD AUTO: 11.36 THOUSAND/UL (ref 4.31–10.16)
WBC #/AREA URNS AUTO: ABNORMAL /HPF

## 2024-07-16 PROCEDURE — 99284 EMERGENCY DEPT VISIT MOD MDM: CPT

## 2024-07-16 PROCEDURE — 85025 COMPLETE CBC W/AUTO DIFF WBC: CPT | Performed by: PHYSICIAN ASSISTANT

## 2024-07-16 PROCEDURE — 71046 X-RAY EXAM CHEST 2 VIEWS: CPT

## 2024-07-16 PROCEDURE — 96361 HYDRATE IV INFUSION ADD-ON: CPT

## 2024-07-16 PROCEDURE — 80053 COMPREHEN METABOLIC PANEL: CPT | Performed by: PHYSICIAN ASSISTANT

## 2024-07-16 PROCEDURE — 85610 PROTHROMBIN TIME: CPT | Performed by: PHYSICIAN ASSISTANT

## 2024-07-16 PROCEDURE — 74177 CT ABD & PELVIS W/CONTRAST: CPT

## 2024-07-16 PROCEDURE — 0241U HB NFCT DS VIR RESP RNA 4 TRGT: CPT | Performed by: PHYSICIAN ASSISTANT

## 2024-07-16 PROCEDURE — 81025 URINE PREGNANCY TEST: CPT | Performed by: PHYSICIAN ASSISTANT

## 2024-07-16 PROCEDURE — 81001 URINALYSIS AUTO W/SCOPE: CPT | Performed by: PHYSICIAN ASSISTANT

## 2024-07-16 PROCEDURE — 36415 COLL VENOUS BLD VENIPUNCTURE: CPT | Performed by: PHYSICIAN ASSISTANT

## 2024-07-16 PROCEDURE — 99285 EMERGENCY DEPT VISIT HI MDM: CPT | Performed by: PHYSICIAN ASSISTANT

## 2024-07-16 PROCEDURE — 87651 STREP A DNA AMP PROBE: CPT | Performed by: PHYSICIAN ASSISTANT

## 2024-07-16 PROCEDURE — 85730 THROMBOPLASTIN TIME PARTIAL: CPT | Performed by: PHYSICIAN ASSISTANT

## 2024-07-16 PROCEDURE — 96360 HYDRATION IV INFUSION INIT: CPT

## 2024-07-16 RX ORDER — CIPROFLOXACIN 500 MG/1
500 TABLET, FILM COATED ORAL 2 TIMES DAILY
Qty: 20 TABLET | Refills: 0 | Status: SHIPPED | OUTPATIENT
Start: 2024-07-16 | End: 2024-07-18

## 2024-07-16 RX ORDER — METRONIDAZOLE 500 MG/1
500 TABLET ORAL EVERY 8 HOURS SCHEDULED
Qty: 30 TABLET | Refills: 0 | Status: SHIPPED | OUTPATIENT
Start: 2024-07-16 | End: 2024-07-18

## 2024-07-16 RX ORDER — ACETAMINOPHEN 325 MG/1
975 TABLET ORAL ONCE
Status: COMPLETED | OUTPATIENT
Start: 2024-07-16 | End: 2024-07-16

## 2024-07-16 RX ADMIN — IOHEXOL 100 ML: 350 INJECTION, SOLUTION INTRAVENOUS at 09:33

## 2024-07-16 RX ADMIN — SODIUM CHLORIDE 1000 ML: 0.9 INJECTION, SOLUTION INTRAVENOUS at 07:45

## 2024-07-16 RX ADMIN — ACETAMINOPHEN 975 MG: 325 TABLET, FILM COATED ORAL at 07:45

## 2024-07-16 NOTE — Clinical Note
Neisha Bowles was seen and treated in our emergency department on 7/16/2024.    No restrictions            Diagnosis:     Neisha  may return to work on return date.    She may return on this date: 07/19/2024         If you have any questions or concerns, please don't hesitate to call.      Elizabeth Baugh PA-C    ______________________________           _______________          _______________  Hospital Representative                              Date                                Time

## 2024-07-17 ENCOUNTER — HOSPITAL ENCOUNTER (OUTPATIENT)
Facility: HOSPITAL | Age: 24
Setting detail: OBSERVATION
Discharge: HOME | End: 2024-07-18
Attending: EMERGENCY MEDICINE | Admitting: INTERNAL MEDICINE
Payer: COMMERCIAL

## 2024-07-17 DIAGNOSIS — R19.7 BLOODY DIARRHEA: ICD-10-CM

## 2024-07-17 DIAGNOSIS — K52.9 COLITIS: Primary | ICD-10-CM

## 2024-07-17 PROBLEM — M54.50 LOW BACK PAIN: Status: ACTIVE | Noted: 2024-07-17

## 2024-07-17 PROBLEM — H91.90 PARTIAL DEAFNESS: Status: ACTIVE | Noted: 2024-05-14

## 2024-07-17 PROBLEM — K62.5 RECTAL BLEEDING: Status: ACTIVE | Noted: 2024-07-17

## 2024-07-17 LAB
ANION GAP SERPL CALCULATED.3IONS-SCNC: 10 MMOL/L (ref 4–13)
BASOPHILS # BLD AUTO: 0.02 THOUSANDS/ÂΜL (ref 0–0.1)
BASOPHILS NFR BLD AUTO: 0 % (ref 0–1)
BUN SERPL-MCNC: 7 MG/DL (ref 5–25)
CALCIUM SERPL-MCNC: 9 MG/DL (ref 8.4–10.2)
CHLORIDE SERPL-SCNC: 101 MMOL/L (ref 96–108)
CO2 SERPL-SCNC: 24 MMOL/L (ref 21–32)
CREAT SERPL-MCNC: 0.66 MG/DL (ref 0.6–1.3)
CRP SERPL QL: 110.7 MG/L
EOSINOPHIL # BLD AUTO: 0.08 THOUSAND/ÂΜL (ref 0–0.61)
EOSINOPHIL NFR BLD AUTO: 1 % (ref 0–6)
ERYTHROCYTE [DISTWIDTH] IN BLOOD BY AUTOMATED COUNT: 11.6 % (ref 11.6–15.1)
ERYTHROCYTE [SEDIMENTATION RATE] IN BLOOD: 37 MM/HOUR (ref 0–19)
GFR SERPL CREATININE-BSD FRML MDRD: 124 ML/MIN/1.73SQ M
GLUCOSE SERPL-MCNC: 80 MG/DL (ref 65–140)
HCT VFR BLD AUTO: 37.6 % (ref 34.8–46.1)
HCT VFR BLD AUTO: 40.2 % (ref 34.8–46.1)
HGB BLD-MCNC: 12.5 G/DL (ref 11.5–15.4)
HGB BLD-MCNC: 13.6 G/DL (ref 11.5–15.4)
IMM GRANULOCYTES # BLD AUTO: 0.06 THOUSAND/UL (ref 0–0.2)
IMM GRANULOCYTES NFR BLD AUTO: 1 % (ref 0–2)
LYMPHOCYTES # BLD AUTO: 0.97 THOUSANDS/ÂΜL (ref 0.6–4.47)
LYMPHOCYTES NFR BLD AUTO: 10 % (ref 14–44)
MCH RBC QN AUTO: 29.3 PG (ref 26.8–34.3)
MCHC RBC AUTO-ENTMCNC: 33.8 G/DL (ref 31.4–37.4)
MCV RBC AUTO: 87 FL (ref 82–98)
MONOCYTES # BLD AUTO: 0.81 THOUSAND/ÂΜL (ref 0.17–1.22)
MONOCYTES NFR BLD AUTO: 9 % (ref 4–12)
NEUTROPHILS # BLD AUTO: 7.41 THOUSANDS/ÂΜL (ref 1.85–7.62)
NEUTS SEG NFR BLD AUTO: 79 % (ref 43–75)
NRBC BLD AUTO-RTO: 0 /100 WBCS
PLATELET # BLD AUTO: 241 THOUSANDS/UL (ref 149–390)
PMV BLD AUTO: 8.8 FL (ref 8.9–12.7)
POTASSIUM SERPL-SCNC: 3.9 MMOL/L (ref 3.5–5.3)
RBC # BLD AUTO: 4.64 MILLION/UL (ref 3.81–5.12)
SODIUM SERPL-SCNC: 135 MMOL/L (ref 135–147)
WBC # BLD AUTO: 9.35 THOUSAND/UL (ref 4.31–10.16)

## 2024-07-17 PROCEDURE — 99223 1ST HOSP IP/OBS HIGH 75: CPT | Performed by: INTERNAL MEDICINE

## 2024-07-17 PROCEDURE — 85025 COMPLETE CBC W/AUTO DIFF WBC: CPT | Performed by: EMERGENCY MEDICINE

## 2024-07-17 PROCEDURE — 36415 COLL VENOUS BLD VENIPUNCTURE: CPT | Performed by: EMERGENCY MEDICINE

## 2024-07-17 PROCEDURE — 96360 HYDRATION IV INFUSION INIT: CPT

## 2024-07-17 PROCEDURE — 83993 ASSAY FOR CALPROTECTIN FECAL: CPT | Performed by: INTERNAL MEDICINE

## 2024-07-17 PROCEDURE — 85014 HEMATOCRIT: CPT | Performed by: NURSE PRACTITIONER

## 2024-07-17 PROCEDURE — 99285 EMERGENCY DEPT VISIT HI MDM: CPT | Performed by: EMERGENCY MEDICINE

## 2024-07-17 PROCEDURE — 87505 NFCT AGENT DETECTION GI: CPT | Performed by: EMERGENCY MEDICINE

## 2024-07-17 PROCEDURE — 80048 BASIC METABOLIC PNL TOTAL CA: CPT | Performed by: EMERGENCY MEDICINE

## 2024-07-17 PROCEDURE — 85652 RBC SED RATE AUTOMATED: CPT | Performed by: PHYSICIAN ASSISTANT

## 2024-07-17 PROCEDURE — 85018 HEMOGLOBIN: CPT | Performed by: NURSE PRACTITIONER

## 2024-07-17 PROCEDURE — 99283 EMERGENCY DEPT VISIT LOW MDM: CPT

## 2024-07-17 PROCEDURE — 86140 C-REACTIVE PROTEIN: CPT | Performed by: PHYSICIAN ASSISTANT

## 2024-07-17 RX ORDER — LIDOCAINE 50 MG/G
1 PATCH TOPICAL DAILY
Status: DISCONTINUED | OUTPATIENT
Start: 2024-07-17 | End: 2024-07-18 | Stop reason: HOSPADM

## 2024-07-17 RX ORDER — ONDANSETRON 2 MG/ML
4 INJECTION INTRAMUSCULAR; INTRAVENOUS EVERY 6 HOURS PRN
Status: DISCONTINUED | OUTPATIENT
Start: 2024-07-17 | End: 2024-07-18 | Stop reason: HOSPADM

## 2024-07-17 RX ORDER — SODIUM CHLORIDE, SODIUM LACTATE, POTASSIUM CHLORIDE, CALCIUM CHLORIDE 600; 310; 30; 20 MG/100ML; MG/100ML; MG/100ML; MG/100ML
125 INJECTION, SOLUTION INTRAVENOUS CONTINUOUS
Status: DISCONTINUED | OUTPATIENT
Start: 2024-07-17 | End: 2024-07-18 | Stop reason: HOSPADM

## 2024-07-17 RX ORDER — PANTOPRAZOLE SODIUM 40 MG/1
40 TABLET, DELAYED RELEASE ORAL
Status: DISCONTINUED | OUTPATIENT
Start: 2024-07-18 | End: 2024-07-18 | Stop reason: HOSPADM

## 2024-07-17 RX ADMIN — MORPHINE SULFATE 2 MG: 2 INJECTION, SOLUTION INTRAMUSCULAR; INTRAVENOUS at 22:41

## 2024-07-17 RX ADMIN — Medication 2 G: at 18:06

## 2024-07-17 RX ADMIN — SODIUM CHLORIDE, SODIUM LACTATE, POTASSIUM CHLORIDE, AND CALCIUM CHLORIDE 125 ML/HR: .6; .31; .03; .02 INJECTION, SOLUTION INTRAVENOUS at 16:41

## 2024-07-17 RX ADMIN — MORPHINE SULFATE 2 MG: 2 INJECTION, SOLUTION INTRAMUSCULAR; INTRAVENOUS at 16:49

## 2024-07-17 RX ADMIN — SODIUM CHLORIDE 1000 ML: 0.9 INJECTION, SOLUTION INTRAVENOUS at 11:50

## 2024-07-17 RX ADMIN — SODIUM CHLORIDE, SODIUM LACTATE, POTASSIUM CHLORIDE, AND CALCIUM CHLORIDE 125 ML/HR: .6; .31; .03; .02 INJECTION, SOLUTION INTRAVENOUS at 22:02

## 2024-07-17 RX ADMIN — LIDOCAINE 5% 1 PATCH: 700 PATCH TOPICAL at 16:49

## 2024-07-17 RX ADMIN — Medication 2 G: at 21:46

## 2024-07-17 NOTE — ASSESSMENT & PLAN NOTE
Patient with abdominal pain frequent bloody diarrhea returns to ED for further evaluation; recent Augmentin course following dog bite initiated 7/10/24  CT imaging:Mural thickening of the ascending colon potentially reflecting infectious or inflammatory colitis.   , ESR 37   GI consulted  No plan for colonoscopy at this time  IV fluids  Stool studies  Per GI for now hold off on antibiotics  Hemoglobin is stable will monitor closely every 8 hours

## 2024-07-17 NOTE — ASSESSMENT & PLAN NOTE
Patient wears hearing aids at baseline has adequate supplies to keep them charged   Supportive care

## 2024-07-17 NOTE — PLAN OF CARE
Problem: PAIN - ADULT  Goal: Verbalizes/displays adequate comfort level or baseline comfort level  Description: Interventions:  - Encourage patient to monitor pain and request assistance  - Assess pain using appropriate pain scale  - Administer analgesics based on type and severity of pain and evaluate response  - Implement non-pharmacological measures as appropriate and evaluate response  - Consider cultural and social influences on pain and pain management  - Notify physician/advanced practitioner if interventions unsuccessful or patient reports new pain  Outcome: Progressing     Problem: INFECTION - ADULT  Goal: Absence or prevention of progression during hospitalization  Description: INTERVENTIONS:  - Assess and monitor for signs and symptoms of infection  - Monitor lab/diagnostic results  - Monitor all insertion sites, i.e. indwelling lines, tubes, and drains  - Monitor endotracheal if appropriate and nasal secretions for changes in amount and color  - [place] appropriate cooling/warming therapies per order  - Administer medications as ordered  - Instruct and encourage patient and family to use good hand hygiene technique  - Identify and instruct in appropriate isolation precautions for identified infection/condition  Outcome: Progressing  Goal: Absence of fever/infection during neutropenic period  Description: INTERVENTIONS:  - Monitor WBC    Outcome: Progressing     Problem: SAFETY ADULT  Goal: Patient will remain free of falls  Description: INTERVENTIONS:  - Educate patient/family on patient safety including physical limitations  - Instruct patient to call for assistance with activity   - Consult OT/PT to assist with strengthening/mobility   - Keep Call bell within reach  - Keep bed low and locked with side rails adjusted as appropriate  - Keep care items and personal belongings within reach  - Initiate and maintain comfort rounds  - Make Fall Risk Sign visible to staff  - Offer Toileting every in  advance of need  - Initiate/Maintain alarm  - Obtain necessary fall risk management equipment  - Apply yellow socks and bracelet for high fall risk patients  - Consider moving patient to room near nurses station  Outcome: Progressing  Goal: Maintain or return to baseline ADL function  Description: INTERVENTIONS:  -  Assess patient's ability to carry out ADLs; assess patient's baseline for ADL function and identify physical deficits which impact ability to perform ADLs (bathing, care of mouth/teeth, toileting, grooming, dressing, etc.)  - Assess/evaluate cause of self-care deficits   - Assess range of motion  - Assess patient's mobility; develop plan if impaired  - Assess patient's need for assistive devices and provide as appropriate  - Encourage maximum independence but intervene and supervise when necessary  - Involve family in performance of ADLs  - Assess for home care needs following discharge   - Consider OT consult to assist with ADL evaluation and planning for discharge  - Provide patient education as appropriate  Outcome: Progressing  Goal: Maintains/Returns to pre admission functional level  Description: INTERVENTIONS:  - Perform AM-PAC 6 Click Basic Mobility/ Daily Activity assessment daily.  - Set and communicate daily mobility goal to care team and patient/family/caregiver.   - Collaborate with rehabilitation services on mobility goals if consulted  - Perform Range of Motion 3 times a day.  - Reposition patient every 2 hours.  - Dangle patient 3 times a day  - Stand patient 3 times a day  - Ambulate patient 3 times a day  - Out of bed to chair 3 times a day   - Out of bed for meals 3 times a day  - Out of bed for toileting  - Record patient progress and toleration of activity level   Outcome: Progressing     Problem: DISCHARGE PLANNING  Goal: Discharge to home or other facility with appropriate resources  Description: INTERVENTIONS:  - Identify barriers to discharge w/patient and caregiver  - Arrange  for needed discharge resources and transportation as appropriate  - Identify discharge learning needs (meds, wound care, etc.)  - Arrange for interpretive services to assist at discharge as needed  - Refer to Case Management Department for coordinating discharge planning if the patient needs post-hospital services based on physician/advanced practitioner order or complex needs related to functional status, cognitive ability, or social support system  Outcome: Progressing     Problem: Knowledge Deficit  Goal: Patient/family/caregiver demonstrates understanding of disease process, treatment plan, medications, and discharge instructions  Description: Complete learning assessment and assess knowledge base.  Interventions:  - Provide teaching at level of understanding  - Provide teaching via preferred learning methods  Outcome: Progressing

## 2024-07-17 NOTE — ED PROVIDER NOTES
"History  Chief Complaint   Patient presents with    Urinary Retention     States \"has not urinated since yesterday at 6 am. Having abdominal pain and continues having rectal bleeding. Dx with colitis\" Bladder scan 30 ml      Neisha Bowles is a 24 y.o.  year old female  Past Medical History:  No date: Deaf, bilateral  Social History    Tobacco Use      Smoking status: Never      Smokeless tobacco: Never    Vaping Use      Vaping status: Never Used    Alcohol use: Yes      Comment: wine-socially    Drug use: Never    Patient presents with:  Urinary Retention: States \"has not urinated since yesterday at 6 am. Having abdominal pain and continues having rectal bleeding. Dx with colitis\" Bladder scan 30 ml   Stool grossly bloody, large amounts of diarrhea  Pain across the abdomen, lower pelvic, now it is 8/10    History obtained directly from the PATIENT              History provided by:  Patient   used: No        Prior to Admission Medications   Prescriptions Last Dose Informant Patient Reported? Taking?   amoxicillin-clavulanate (AUGMENTIN) 875-125 mg per tablet   No No   Sig: Take 1 tablet by mouth every 12 (twelve) hours for 7 days   Patient not taking: Reported on 7/15/2024   amoxicillin-clavulanate (AUGMENTIN) 875-125 mg per tablet   No No   Sig: Take 1 tablet by mouth every 12 (twelve) hours for 7 days   Patient not taking: Reported on 7/15/2024   ciprofloxacin (CIPRO) 500 mg tablet   No No   Sig: Take 1 tablet (500 mg total) by mouth 2 (two) times a day for 10 days   ibuprofen (MOTRIN) 600 mg tablet  Self No No   Sig: Take 1 tablet (600 mg total) by mouth every 6 (six) hours as needed for moderate pain   Patient not taking: Reported on 10/5/2022   metroNIDAZOLE (FLAGYL) 500 mg tablet   No No   Sig: Take 1 tablet (500 mg total) by mouth every 8 (eight) hours for 10 days      Facility-Administered Medications: None       Past Medical History:   Diagnosis Date    Deaf, bilateral        Past " Surgical History:   Procedure Laterality Date    COCHLEAR IMPLANT Right     MN LAPS FULG/EXC OVARY VISCERA/PERITONEAL SURFACE Left 3/18/2022    Procedure: LAPAROSCOPIC REMOVAL OF LEFT OVARIAN CYST;  Surgeon: Zainab Thomson MD;  Location: [facility];  Service: Gynecology    REDUCTION MAMMAPLASTY         Family History   Problem Relation Age of Onset    Breast cancer Neg Hx     Ovarian cancer Neg Hx     Uterine cancer Neg Hx     Cervical cancer Neg Hx      I have reviewed and agree with the history as documented.    E-Cigarette/Vaping    E-Cigarette Use Never User      E-Cigarette/Vaping Substances    Nicotine No     THC No     CBD No     Flavoring No     Other No     Unknown No      Social History     Tobacco Use    Smoking status: Never    Smokeless tobacco: Never   Vaping Use    Vaping status: Never Used   Substance Use Topics    Alcohol use: Yes     Comment: wine-socially    Drug use: Never       Review of Systems   Constitutional:  Negative for chills and fever.   HENT:  Negative for ear pain and sore throat.    Eyes:  Negative for pain and visual disturbance.   Respiratory:  Negative for cough and shortness of breath.    Cardiovascular:  Negative for chest pain and palpitations.   Gastrointestinal:  Positive for abdominal pain, blood in stool and diarrhea. Negative for vomiting.   Genitourinary:  Negative for dysuria and hematuria.   Musculoskeletal:  Negative for arthralgias and back pain.   Skin:  Negative for color change and rash.   Neurological:  Negative for seizures and syncope.   All other systems reviewed and are negative.      Physical Exam  Physical Exam  Vitals and nursing note reviewed.   Constitutional:       General: She is not in acute distress.     Appearance: Normal appearance. She is well-developed and normal weight.   HENT:      Head: Normocephalic and atraumatic.      Right Ear: External ear normal.      Left Ear: External ear normal.   Eyes:      Extraocular Movements: Extraocular  movements intact.      Conjunctiva/sclera: Conjunctivae normal.      Pupils: Pupils are equal, round, and reactive to light.   Cardiovascular:      Rate and Rhythm: Normal rate and regular rhythm.      Heart sounds: No murmur heard.  Pulmonary:      Effort: Pulmonary effort is normal. No respiratory distress.      Breath sounds: Normal breath sounds.   Abdominal:      Palpations: Abdomen is soft.      Tenderness: There is abdominal tenderness.      Hernia: No hernia is present.   Musculoskeletal:         General: No swelling.      Cervical back: Neck supple.   Skin:     General: Skin is warm and dry.      Capillary Refill: Capillary refill takes less than 2 seconds.   Neurological:      General: No focal deficit present.      Mental Status: She is alert and oriented to person, place, and time.   Psychiatric:         Mood and Affect: Mood normal.         Thought Content: Thought content normal.         Vital Signs  ED Triage Vitals [07/17/24 1119]   Temperature Pulse Respirations Blood Pressure SpO2   97.7 °F (36.5 °C) 83 17 117/75 96 %      Temp Source Heart Rate Source Patient Position - Orthostatic VS BP Location FiO2 (%)   Oral Monitor Sitting Right arm --      Pain Score       --           Vitals:    07/17/24 1119   BP: 117/75   Pulse: 83   Patient Position - Orthostatic VS: Sitting         Visual Acuity      ED Medications  Medications   sodium chloride 0.9 % bolus 1,000 mL (1,000 mL Intravenous New Bag 7/17/24 1150)       Diagnostic Studies  Results Reviewed       Procedure Component Value Units Date/Time    Basic metabolic panel [[id number]] Collected: 07/17/24 1154    Lab Status: Final result Specimen: Blood from Arm, Right Updated: 07/17/24 1231     Sodium 135 mmol/L      Potassium 3.9 mmol/L      Chloride 101 mmol/L      CO2 24 mmol/L      ANION GAP 10 mmol/L      BUN 7 mg/dL      Creatinine 0.66 mg/dL      Glucose 80 mg/dL      Calcium 9.0 mg/dL      eGFR 124 ml/min/1.73sq m     Narrative:      National  Kidney Disease Foundation guidelines for Chronic Kidney Disease (CKD):     Stage 1 with normal or high GFR (GFR > 90 mL/min/1.73 square meters)    Stage 2 Mild CKD (GFR = 60-89 mL/min/1.73 square meters)    Stage 3A Moderate CKD (GFR = 45-59 mL/min/1.73 square meters)    Stage 3B Moderate CKD (GFR = 30-44 mL/min/1.73 square meters)    Stage 4 Severe CKD (GFR = 15-29 mL/min/1.73 square meters)    Stage 5 End Stage CKD (GFR <15 mL/min/1.73 square meters)  Note: GFR calculation is accurate only with a steady state creatinine    CBC and differential [[id number]]  (Abnormal) Collected: 07/17/24 1154    Lab Status: Final result Specimen: Blood from Arm, Right Updated: 07/17/24 1207     WBC 9.35 Thousand/uL      RBC 4.64 Million/uL      Hemoglobin 13.6 g/dL      Hematocrit 40.2 %      MCV 87 fL      MCH 29.3 pg      MCHC 33.8 g/dL      RDW 11.6 %      MPV 8.8 fL      Platelets 241 Thousands/uL      nRBC 0 /100 WBCs      Segmented % 79 %      Immature Grans % 1 %      Lymphocytes % 10 %      Monocytes % 9 %      Eosinophils Relative 1 %      Basophils Relative 0 %      Absolute Neutrophils 7.41 Thousands/µL      Absolute Immature Grans 0.06 Thousand/uL      Absolute Lymphocytes 0.97 Thousands/µL      Absolute Monocytes 0.81 Thousand/µL      Eosinophils Absolute 0.08 Thousand/µL      Basophils Absolute 0.02 Thousands/µL     Stool Enteric Bacterial Panel by PCR [[id number]] Collected: 07/17/24 1157    Lab Status: In process Specimen: Stool from Rectum Updated: 07/17/24 1203    Clostridium difficile toxin by PCR with EIA [[id number]]     Lab Status: No result Specimen: Stool from Per Rectum     Ova and parasite examination [[id number]]     Lab Status: No result Specimen: Stool from Rectum                    No orders to display              Procedures  Procedures         ED Course  ED Course as of 07/17/24 1233   Wed Jul 17, 2024   1216 WBC: 9.35   1216 Hemoglobin: 13.6   1219 Bladder scan no retention (volume <80mL)                                                Medical Decision Making  I have ordered CBC.  This was done to assess for leukocytosis versus leukopenia as possible indicators of infection viral versus bacterial.  Also included hemoglobin and hematocrit for assess for anemia.  And assess platelet numbers.  Thrombocytosis as a marker of inflammation and of course thrombocytopenia assess result of coagulation disorder or DIC.    Metabolic panel to assess for electrolyte deficiency, assess kidney function, and blood sugar levels to assess for hypoglycemia versus hyperglycemia as possible causes of the patient's symptoms.    Liver function test -to assess for liver inflammation with a be viral, obstructive, or reactive to underlying infection/shock liver    Lipase -screen for elevated level as a sign of pancreatitis    Normal Hgb and WBC    Patient has presented to the Emergency Department with exacerbation of chronic condition / pt with new illness-injury that may poses a threat to life or body function.  At least 3 different tests have been ordered on this patient AND reviewed the results.   Old laboratory data (of at least 2 tests) were reviewed from the medical records and compared to today's results  Discussion with patient and/or family members of results (normal and abnormal) and the implications for immediate and long term treatment/management.  Due to the high risk of morbidity further diagnostic testing and treatment is necessary.    12:18 PM  I discussed the case with the hospitalist. We reviewed the HPI, pertinent PMH, ED course and management.   Hospitalist agreed with plan and will admit the patient to the hospital.    Medications  sodium chloride 0.9 % bolus 1,000 mL (1,000 mL Intravenous New Bag 7/17/24 1150)               Problems Addressed:  Bloody diarrhea: acute illness or injury  Colitis: acute illness or injury    Amount and/or Complexity of Data Reviewed  Labs: ordered. Decision-making details  documented in ED Course.    Risk  Decision regarding hospitalization.                 Disposition  Final diagnoses:   Colitis   Bloody diarrhea     Time reflects when diagnosis was documented in both MDM as applicable and the Disposition within this note       Time User Action Codes Description Comment    7/17/2024 12:33 PM Albert Ayers [K52.9] Colitis     7/17/2024 12:33 PM Albert Ayers [R19.7] Bloody diarrhea           ED Disposition       ED Disposition   Admit    Condition   Stable    Date/Time   Wed Jul 17, 2024 12:33 PM    Comment   Case was discussed with Hospitalist and the patient's admission status was agreed to be Admission Status: observation status to the service of Dr. Zheng .               Follow-up Information    None         Patient's Medications   Discharge Prescriptions    No medications on file       No discharge procedures on file.    PDMP Review         Value Time User    PDMP Reviewed  Yes 3/18/2022  2:44 PM Zainab Thomson MD            ED Provider  Electronically Signed by             Albert Ayers MD  07/17/24 4540

## 2024-07-17 NOTE — H&P
"[facility]  History and Physical  Name: Neisha Bowles I MRN: [id number]  Unit/Bed#: -01I Date of Admission: 7/17/2024   Date of Service: 7/17/2024  I Hospital Day: 0    * Colitis  Assessment & Plan  Patient with abdominal pain frequent bloody diarrhea return to ED for further evaluation  CT imaging:Mural thickening of the ascending colon potentially reflecting infectious or inflammatory colitis.   CRP noted to be 110 ESR 37  GI consult  No plan for colonoscopy at this time  IV fluids  Stool studies  Per GI for now hold off on antibiotics  Hemoglobin is stable will monitor closely every 8 hours  Of note patient was on Augmentin starting 7/10/2024 for dog bite    Diarrhea  Assessment & Plan  Plan as above    Partial deafness  Assessment & Plan  Supportive care  Patient wears hearing aids at baseline states has adequate supplies to keep her hearing aids charged        VTE Pharmacologic Prophylaxis: VTE Score: 2 Low Risk (Score 0-2) - Encourage Ambulation.  Code Status: No Order full code  Discussion with family: Patient declined call to .     Anticipated Length of Stay: Patient will be admitted on an observation basis with an anticipated length of stay of less than 2 midnights secondary to colitis workup.    Total Time Spent on Date of Encounter in care of patient: 45 mins. This time was spent on one or more of the following: performing physical exam; counseling and coordination of care; obtaining or reviewing history; documenting in the medical record; reviewing/ordering tests, medications or procedures; communicating with other healthcare professionals and discussing with patient's family/caregivers.    Chief Complaint: Urinary Retention (States \"has not urinated since yesterday at 6 am. Having abdominal pain and continues having rectal bleeding. Dx with colitis\" Bladder scan 30 ml )      History of Present Illness:  Neisha Bowles is a 24 y.o. female with a PMH of  has " a past medical history of Deaf, bilateral. who presents with the above chief complaint.  Presenting with abdominal pain that was increasing came to the ED initially was sent home with antibiotics pain became persistent and she started with frequent bloody bowel movements.  Came back to the ED for further evaluation where patient showed evidence of colitis GI was consulted recommended IV fluids stool studies hold antibiotics now and continue to monitor.  Patient has no prior history of colitis reports recent antibiotic use due to dog bite and was on Augmentin starting 7/10/2024.  Patient is reporting some dizziness, persistent abdominal pain and back pain from bending over so much.    Review of Systems:  Review of Systems   Constitutional:  Positive for appetite change and fatigue.   Respiratory: Negative.     Cardiovascular: Negative.    Gastrointestinal:  Positive for abdominal pain, anal bleeding, blood in stool, diarrhea and nausea.   Neurological:  Positive for dizziness. Negative for light-headedness and headaches.       Past Medical and Surgical History:   Past Medical History:   Diagnosis Date    Deaf, bilateral         Past Surgical History:   Procedure Laterality Date    COCHLEAR IMPLANT Right     NE LAPS FULG/EXC OVARY VISCERA/PERITONEAL SURFACE Left 3/18/2022    Procedure: LAPAROSCOPIC REMOVAL OF LEFT OVARIAN CYST;  Surgeon: Zainab Thomson MD;  Location: [facility];  Service: Gynecology    REDUCTION MAMMAPLASTY         Meds/Allergies:  Prior to Admission medications    Medication Sig Start Date End Date Taking? Authorizing Provider   amoxicillin-clavulanate (AUGMENTIN) 875-125 mg per tablet Take 1 tablet by mouth every 12 (twelve) hours for 7 days  Patient not taking: Reported on 7/15/2024 7/10/24 7/17/24  Sergio Siegel MD   amoxicillin-clavulanate (AUGMENTIN) 875-125 mg per tablet Take 1 tablet by mouth every 12 (twelve) hours for 7 days  Patient not taking: Reported on 7/15/2024 7/10/24 7/17/24   Sergio Siegel MD   ciprofloxacin (CIPRO) 500 mg tablet Take 1 tablet (500 mg total) by mouth 2 (two) times a day for 10 days 7/16/24 7/26/24  Elizabeth Baugh PA-C   ibuprofen (MOTRIN) 600 mg tablet Take 1 tablet (600 mg total) by mouth every 6 (six) hours as needed for moderate pain  Patient not taking: Reported on 10/5/2022 3/18/22   Zainab Thomson MD   metroNIDAZOLE (FLAGYL) 500 mg tablet Take 1 tablet (500 mg total) by mouth every 8 (eight) hours for 10 days 7/16/24 7/26/24  Elizabeth Baugh PA-C     I have reviewed home medications with patient personally.    Allergies:   Allergies   Allergen Reactions    Amoxicillin Fever    Pollen Extract Nasal Congestion       Social History:  Marital Status: Single   Occupation: Dental assistant  Patient Pre-hospital Living Situation: Home  Patient Pre-hospital Level of Mobility: walks  Patient Pre-hospital Diet Restrictions: None  Substance Use History:   Social History     Substance and Sexual Activity   Alcohol Use Yes    Comment: wine-socially     Social History     Tobacco Use   Smoking Status Never   Smokeless Tobacco Never     Social History     Substance and Sexual Activity   Drug Use Never       Family History:  Family History   Problem Relation Age of Onset    Breast cancer Neg Hx     Ovarian cancer Neg Hx     Uterine cancer Neg Hx     Cervical cancer Neg Hx        Physical Exam:     Vitals:   Blood Pressure: 147/77 (07/17/24 1526)  Pulse: 87 (07/17/24 1526)  Temperature: 97.7 °F (36.5 °C) (07/17/24 1119)  Temp Source: Oral (07/17/24 1119)  Respirations: 17 (07/17/24 1330)  SpO2: 100 % (07/17/24 1330)    Physical Exam  Vitals and nursing note reviewed.   Constitutional:       General: She is not in acute distress.     Appearance: She is well-developed.   HENT:      Head: Normocephalic and atraumatic.   Eyes:      Conjunctiva/sclera: Conjunctivae normal.   Cardiovascular:      Rate and Rhythm: Normal rate and regular rhythm.      Heart sounds: No murmur  heard.  Pulmonary:      Effort: Pulmonary effort is normal. No respiratory distress.      Breath sounds: Normal breath sounds.   Abdominal:      Palpations: Abdomen is soft.      Tenderness: There is abdominal tenderness. There is guarding.   Musculoskeletal:         General: No swelling.      Cervical back: Neck supple.   Skin:     General: Skin is warm and dry.      Capillary Refill: Capillary refill takes less than 2 seconds.   Neurological:      Mental Status: She is alert and oriented to person, place, and time.   Psychiatric:         Mood and Affect: Mood normal.          Additional Data:     Lab Results:  Results from last 7 days   Lab Units 07/17/24  1154   WBC Thousand/uL 9.35   HEMOGLOBIN g/dL 13.6   HEMATOCRIT % 40.2   PLATELETS Thousands/uL 241   SEGS PCT % 79*   LYMPHO PCT % 10*   MONO PCT % 9   EOS PCT % 1     Results from last 7 days   Lab Units 07/17/24  1154 07/16/24  0722   SODIUM mmol/L 135 136   POTASSIUM mmol/L 3.9 3.8   CHLORIDE mmol/L 101 101   CO2 mmol/L 24 26   BUN mg/dL 7 6   CREATININE mg/dL 0.66 0.77   ANION GAP mmol/L 10 9   CALCIUM mg/dL 9.0 9.3   ALBUMIN g/dL  --  4.2   TOTAL BILIRUBIN mg/dL  --  0.56   ALK PHOS U/L  --  52   ALT U/L  --  12   AST U/L  --  12*   GLUCOSE RANDOM mg/dL 80 102     Results from last 7 days   Lab Units 07/16/24  0722   INR  1.12                   Lines/Drains:  Invasive Devices       Peripheral Intravenous Line  Duration             Peripheral IV 07/17/24 Right Antecubital <1 day                        Imaging: Reviewed radiology reports from this admission including: abdominal/pelvic CT  No orders to display       EKG and Other Studies Reviewed on Admission:   EKG: No EKG obtained.    ** Please Note: This note has been constructed using a voice recognition system. **

## 2024-07-17 NOTE — ASSESSMENT & PLAN NOTE
Patient reporting low back pain from bending over so much due to her abdominal pain  Will add Voltaren cream and Lidoderm patch

## 2024-07-17 NOTE — CONSULTS
Consultation -  [facility]  Neisha Bowles 24 y.o. female MRN: [id number]  Unit/Bed#: ED 11 Encounter: [id number]      Inpatient consult to gastroenterology  Consult performed by: Shaista Nino PA-C  Consult ordered by: Jose Zheng DO           Reason for Consult / Principal Problem: Diarrhea/RB/Colitis on CT    HPI: Neisha Bowles is a 24 y.o. year old female who presents with no significant PMH.     Patient reports back to [facility] emergency department today after being evaluated yesterday for ongoing episodes of diarrhea and abdominal pain.    Patient reports that she was discharged home yesterday with Cipro Flagyl.  She reports that last night she began to experience severe profuse diarrhea with worsening abdominal pain.  Patient came to the emergency department yesterday due to abdominal pain and diarrhea and was diagnosed with colitis.  CTAP from 7/16 shows mural thickening of the ascending colon.  Patient also reports that she is noting bright red blood per rectum as well.  Today laboratories appear unremarkable.  Patient reports that she is still experiencing nausea, decreased appetite, lower abdominal pain, diarrhea.  Patient denies any recent travel or sick contacts.  Patient was on Augmentin for dog bite since 7/10/2024.  Patient denies any family history of ulcerative colitis or Crohn's disease.    REVIEW OF SYSTEMS:     CONSTITUTIONAL: Denies any fever, chills, or rigors. Good appetite, and no recent weight loss.  HEENT: No earache or tinnitus. Denies hearing loss or visual disturbances.  CARDIOVASCULAR: No chest pain or palpitations.   RESPIRATORY: Denies any cough, hemoptysis, shortness of breath or dyspnea on exertion.  GASTROINTESTINAL: As noted in the History of Present Illness.   GENITOURINARY: No problems with urination. Denies any hematuria or dysuria.  NEUROLOGIC: No dizziness or vertigo, denies headaches.   MUSCULOSKELETAL: Denies any muscle or joint pain.    SKIN: Denies skin rashes or itching.   ENDOCRINE: Denies excessive thirst. Denies intolerance to heat or cold.  PSYCHOSOCIAL: Denies depression or anxiety. Denies any recent memory loss.     Historical Information   Past Medical History:   Diagnosis Date    Deaf, bilateral      Past Surgical History:   Procedure Laterality Date    COCHLEAR IMPLANT Right     AR LAPS FULG/EXC OVARY VISCERA/PERITONEAL SURFACE Left 3/18/2022    Procedure: LAPAROSCOPIC REMOVAL OF LEFT OVARIAN CYST;  Surgeon: Zainab Thomson MD;  Location: [facility];  Service: Gynecology    REDUCTION MAMMAPLASTY       Social History   Social History     Substance and Sexual Activity   Alcohol Use Yes    Comment: wine-socially     Social History     Substance and Sexual Activity   Drug Use Never     Social History     Tobacco Use   Smoking Status Never   Smokeless Tobacco Never     Family History   Problem Relation Age of Onset    Breast cancer Neg Hx     Ovarian cancer Neg Hx     Uterine cancer Neg Hx     Cervical cancer Neg Hx        Meds/Allergies     Not in a hospital admission.  No current facility-administered medications for this encounter.       Allergies   Allergen Reactions    Amoxicillin Fever    Pollen Extract Nasal Congestion       Objective   Blood pressure 117/75, pulse 83, temperature 97.7 °F (36.5 °C), temperature source Oral, resp. rate 17, SpO2 96%.    Intake/Output Summary (Last 24 hours) at 7/17/2024 1322  Last data filed at 7/17/2024 1250  Gross per 24 hour   Intake 1000 ml   Output --   Net 1000 ml         PHYSICAL EXAM:      General Appearance:   Alert, cooperative, no distress, appears stated age    HEENT:   Normocephalic, atraumatic, anicteric.     Neck:  Supple, symmetrical, trachea midline, no adenopathy;    thyroid: no enlargement/tenderness/nodules; no carotid  bruit or JVD    Lungs:   Clear to auscultation bilaterally; no rales, rhonchi or wheezing; respirations unlabored    Heart::   S1 and S2 normal; regular rate  and rhythm; no murmur, rub, or gallop.   Abdomen:   Soft, non-tender, non-distended; normal bowel sounds; no masses, no organomegaly    Genitalia:   Deferred    Rectal:   Deferred    Extremities:  No cyanosis, clubbing or edema    Pulses:  2+ and symmetric all extremities    Skin:  Skin color, texture, turgor normal, no rashes or lesions    Lymph nodes:  No palpable cervical, axillary or inguinal lymphadenopathy        Lab Results:   Admission on 07/17/2024   Component Date Value    WBC 07/17/2024 9.35     RBC 07/17/2024 4.64     Hemoglobin 07/17/2024 13.6     Hematocrit 07/17/2024 40.2     MCV 07/17/2024 87     MCH 07/17/2024 29.3     MCHC 07/17/2024 33.8     RDW 07/17/2024 11.6     MPV 07/17/2024 8.8 (L)     Platelets 07/17/2024 241     nRBC 07/17/2024 0     Segmented % 07/17/2024 79 (H)     Immature Grans % 07/17/2024 1     Lymphocytes % 07/17/2024 10 (L)     Monocytes % 07/17/2024 9     Eosinophils Relative 07/17/2024 1     Basophils Relative 07/17/2024 0     Absolute Neutrophils 07/17/2024 7.41     Absolute Immature Grans 07/17/2024 0.06     Absolute Lymphocytes 07/17/2024 0.97     Absolute Monocytes 07/17/2024 0.81     Eosinophils Absolute 07/17/2024 0.08     Basophils Absolute 07/17/2024 0.02     Sodium 07/17/2024 135     Potassium 07/17/2024 3.9     Chloride 07/17/2024 101     CO2 07/17/2024 24     ANION GAP 07/17/2024 10     BUN 07/17/2024 7     Creatinine 07/17/2024 0.66     Glucose 07/17/2024 80     Calcium 07/17/2024 9.0     eGFR 07/17/2024 124        Imaging Studies: I have personally reviewed pertinent imaging studies.        ASSESSMENT & PLAN:  Diarrhea  Abdominal pain  Colitis on CT  Rectal bleeding  -Patient presents back to the [facility] emergency department today with a chief complaint of diarrhea, abdominal pain, rectal bleeding.  -7/16/2024 CTAP shows ascending colitis.  -CBC normal.    -Will check ESR CRP.  -Stool for enteric pathogen and C. difficile pending.  -Patient had been on Augmentin  therapy for a dog bite starting on 7/10/2024.  -Serial abdominal exams  -IV fluids  -Continue to trend CBC  -Continue to monitor vital signs  -No plans for colonoscopy at this time.  -Diet as tolerated.  -Will hold off on antibiotic use at this time.    Patient be seen and examined by Dr. Carnes.  All rod medical decision made by Dr. Carnes.    Shaista Nino PA-C  7/17/2024,1:22 PM

## 2024-07-18 VITALS
HEIGHT: 65 IN | WEIGHT: 159.17 LBS | OXYGEN SATURATION: 100 % | HEART RATE: 77 BPM | RESPIRATION RATE: 16 BRPM | SYSTOLIC BLOOD PRESSURE: 118 MMHG | DIASTOLIC BLOOD PRESSURE: 67 MMHG | BODY MASS INDEX: 26.52 KG/M2 | TEMPERATURE: 97.7 F

## 2024-07-18 LAB
ANION GAP SERPL CALCULATED.3IONS-SCNC: 5 MMOL/L (ref 4–13)
BUN SERPL-MCNC: 5 MG/DL (ref 5–25)
C COLI+JEJUNI TUF STL QL NAA+PROBE: NEGATIVE
C DIFF TOX GENS STL QL NAA+PROBE: NEGATIVE
CALCIUM SERPL-MCNC: 8.5 MG/DL (ref 8.4–10.2)
CHLORIDE SERPL-SCNC: 107 MMOL/L (ref 96–108)
CO2 SERPL-SCNC: 24 MMOL/L (ref 21–32)
CREAT SERPL-MCNC: 0.63 MG/DL (ref 0.6–1.3)
CRP SERPL QL: 82.6 MG/L
EC STX1+STX2 GENES STL QL NAA+PROBE: NEGATIVE
ERYTHROCYTE [DISTWIDTH] IN BLOOD BY AUTOMATED COUNT: 11.7 % (ref 11.6–15.1)
GFR SERPL CREATININE-BSD FRML MDRD: 125 ML/MIN/1.73SQ M
GLUCOSE P FAST SERPL-MCNC: 88 MG/DL (ref 65–99)
GLUCOSE SERPL-MCNC: 88 MG/DL (ref 65–140)
HCT VFR BLD AUTO: 33.3 % (ref 34.8–46.1)
HCT VFR BLD AUTO: 33.9 % (ref 34.8–46.1)
HGB BLD-MCNC: 11.4 G/DL (ref 11.5–15.4)
HGB BLD-MCNC: 11.7 G/DL (ref 11.5–15.4)
MCH RBC QN AUTO: 29.6 PG (ref 26.8–34.3)
MCHC RBC AUTO-ENTMCNC: 34.5 G/DL (ref 31.4–37.4)
MCV RBC AUTO: 86 FL (ref 82–98)
PLATELET # BLD AUTO: 228 THOUSANDS/UL (ref 149–390)
PMV BLD AUTO: 9 FL (ref 8.9–12.7)
POTASSIUM SERPL-SCNC: 4.1 MMOL/L (ref 3.5–5.3)
RBC # BLD AUTO: 3.95 MILLION/UL (ref 3.81–5.12)
SALMONELLA SP SPAO STL QL NAA+PROBE: POSITIVE
SHIGELLA SP+EIEC IPAH STL QL NAA+PROBE: NEGATIVE
SODIUM SERPL-SCNC: 136 MMOL/L (ref 135–147)
WBC # BLD AUTO: 8.14 THOUSAND/UL (ref 4.31–10.16)

## 2024-07-18 PROCEDURE — 99238 HOSP IP/OBS DSCHRG MGMT 30/<: CPT | Performed by: PHYSICIAN ASSISTANT

## 2024-07-18 PROCEDURE — 85027 COMPLETE CBC AUTOMATED: CPT | Performed by: INTERNAL MEDICINE

## 2024-07-18 PROCEDURE — 99232 SBSQ HOSP IP/OBS MODERATE 35: CPT | Performed by: INTERNAL MEDICINE

## 2024-07-18 PROCEDURE — 80048 BASIC METABOLIC PNL TOTAL CA: CPT | Performed by: INTERNAL MEDICINE

## 2024-07-18 PROCEDURE — 99232 SBSQ HOSP IP/OBS MODERATE 35: CPT | Performed by: PHYSICIAN ASSISTANT

## 2024-07-18 PROCEDURE — 87209 SMEAR COMPLEX STAIN: CPT | Performed by: NURSE PRACTITIONER

## 2024-07-18 PROCEDURE — 85014 HEMATOCRIT: CPT | Performed by: NURSE PRACTITIONER

## 2024-07-18 PROCEDURE — 86140 C-REACTIVE PROTEIN: CPT | Performed by: INTERNAL MEDICINE

## 2024-07-18 PROCEDURE — 85018 HEMOGLOBIN: CPT | Performed by: NURSE PRACTITIONER

## 2024-07-18 PROCEDURE — 87177 OVA AND PARASITES SMEARS: CPT | Performed by: NURSE PRACTITIONER

## 2024-07-18 RX ADMIN — PANTOPRAZOLE SODIUM 40 MG: 40 TABLET, DELAYED RELEASE ORAL at 05:20

## 2024-07-18 RX ADMIN — Medication 2 G: at 09:15

## 2024-07-18 RX ADMIN — SODIUM CHLORIDE, SODIUM LACTATE, POTASSIUM CHLORIDE, AND CALCIUM CHLORIDE 125 ML/HR: .6; .31; .03; .02 INJECTION, SOLUTION INTRAVENOUS at 05:19

## 2024-07-18 RX ADMIN — LIDOCAINE 5% 1 PATCH: 700 PATCH TOPICAL at 09:15

## 2024-07-18 NOTE — UTILIZATION REVIEW
" Initial Clinical Review    Admission: Date/Time/Statement:   Admission Orders (From admission, onward)       Ordered        07/17/24 1233  Place in Observation  Once                          Orders Placed This Encounter   Procedures    Place in Observation     Standing Status:   Standing     Number of Occurrences:   1     Order Specific Question:   Level of Care     Answer:   Med Surg [16]     ED Arrival Information       Expected   -    Arrival   7/17/2024 10:59    Acuity   Urgent              Means of arrival   Walk-In    Escorted by   Self    Service   Hospitalist    Admission type   Emergency              Arrival complaint   RECTAL BLEEDING             Chief Complaint   Patient presents with    Urinary Retention     States \"has not urinated since yesterday at 6 am. Having abdominal pain and continues having rectal bleeding. Dx with colitis\" Bladder scan 30 ml        Initial Presentation: 24 y.o. female to the ED from home with complaints of urinary retention for 5 hours, abdominal pain, rectal bleeding.  Admitted under observation for colitis, diarrhea.  Seen in ED  on 7/16 and sent home  on cipro and flagyl.  CT a/p shows: ascending colitis.  WBCs WNL, .7, ESR 37. Stool studies pending. Diet at tolerated.  GI consult.       Date: 7/18   Day 2:      ED Triage Vitals   Temperature Pulse Respirations Blood Pressure SpO2 Pain Score   07/17/24 1119 07/17/24 1119 07/17/24 1119 07/17/24 1119 07/17/24 1119 07/17/24 1526   97.7 °F (36.5 °C) 83 17 117/75 96 % 9     Weight (last 2 days)       Date/Time Weight    07/17/24 1757 72.2 (159.17)    07/17/24 1749 72.2 (159.17)            Vital Signs (last 3 days)       Date/Time Temp Pulse Resp BP MAP (mmHg) SpO2 O2 Device Patient Position - Orthostatic VS Pain    07/18/24 0700 -- 77 16 118/67 85 100 % None (Room air) Lying --    07/17/24 2300 -- 87 16 120/64 86 -- -- Lying --    07/17/24 2241 -- 90 -- 126/72 -- 100 % None (Room air) -- 7    07/17/24 1749 97.7 °F (36.5 " "°C) 87 16 147/77 -- -- -- -- --    07/17/24 1649 -- -- -- -- -- -- -- -- 9 07/17/24 1602 -- -- -- -- -- -- -- -- 9 07/17/24 1600 -- -- -- -- -- -- -- -- 9 07/17/24 1526 -- 87 -- 147/77 100 -- None (Room air) Lying 9    07/17/24 1330 -- 86 17 106/71 84 100 % None (Room air) Lying --    07/17/24 1308 -- -- -- -- -- -- None (Room air) -- --    07/17/24 1119 97.7 °F (36.5 °C) 83 17 117/75 -- 96 % None (Room air) Sitting --              Pertinent Labs/Diagnostic Test Results:   Radiology:  No orders to display     Cardiology:  No orders to display     GI:  No orders to display       Results from last 7 days   Lab Units 07/16/24 0722   SARS-COV-2  Negative     Results from last 7 days   Lab Units 07/18/24  0006 07/17/24  1611 07/17/24  1154 07/16/24  0722   WBC Thousand/uL 8.14  --  9.35 11.36*   HEMOGLOBIN g/dL 11.7 12.5 13.6 14.1   HEMATOCRIT % 33.9* 37.6 40.2 41.7   PLATELETS Thousands/uL 228  --  241 265   TOTAL NEUT ABS Thousands/µL  --   --  7.41 9.36*         Results from last 7 days   Lab Units 07/18/24  0006 07/17/24  1154 07/16/24  0722   SODIUM mmol/L 136 135 136   POTASSIUM mmol/L 4.1 3.9 3.8   CHLORIDE mmol/L 107 101 101   CO2 mmol/L 24 24 26   ANION GAP mmol/L 5 10 9   BUN mg/dL 5 7 6   CREATININE mg/dL 0.63 0.66 0.77   EGFR ml/min/1.73sq m 125 124 108   CALCIUM mg/dL 8.5 9.0 9.3     Results from last 7 days   Lab Units 07/16/24  0722   AST U/L 12*   ALT U/L 12   ALK PHOS U/L 52   TOTAL PROTEIN g/dL 7.6   ALBUMIN g/dL 4.2   TOTAL BILIRUBIN mg/dL 0.56         Results from last 7 days   Lab Units 07/18/24  0006 07/17/24  1154 07/16/24  0722   GLUCOSE RANDOM mg/dL 88 80 102             No results found for: \"BETA-HYDROXYBUTYRATE\"                           Results from last 7 days   Lab Units 07/16/24  0722   PROTIME seconds 15.0*   INR  1.12   PTT seconds 30                                                 Results from last 7 days   Lab Units 07/18/24  0006 07/17/24  1154   CRP mg/L 82.6* 110.7*   SED " RATE mm/hour  --  37*             Results from last 7 days   Lab Units 07/16/24  0721   CLARITY UA  Turbid   COLOR UA  Yellow   SPEC GRAV UA  1.027   PH UA  6.0   GLUCOSE UA mg/dl Negative   KETONES UA mg/dl 40 (2+)*   BLOOD UA  Trace*   PROTEIN UA mg/dl 30 (1+)*   NITRITE UA  Negative   BILIRUBIN UA  Negative   UROBILINOGEN UA (BE) mg/dl <2.0   LEUKOCYTES UA  Negative   WBC UA /hpf 2-4*   RBC UA /hpf 2-4*   BACTERIA UA /hpf None Seen   EPITHELIAL CELLS WET PREP /hpf Innumerable*   MUCUS THREADS  Innumerable*     Results from last 7 days   Lab Units 07/16/24  0722   INFLUENZA A PCR  Negative   INFLUENZA B PCR  Negative   RSV PCR  Negative                                               ED Treatment-Medication Administration from 07/17/2024 1059 to 07/17/2024 1522         Date/Time Order Dose Route Action     07/17/2024 1150 sodium chloride 0.9 % bolus 1,000 mL 1,000 mL Intravenous New Bag            Past Medical History:   Diagnosis Date    Deaf, bilateral      Present on Admission:   Partial deafness   Colitis   Diarrhea   Low back pain   Rectal bleeding      Admitting Diagnosis: Colitis [K52.9]  Urine retention [R33.9]  Bloody diarrhea [R19.7]  Age/Sex: 24 y.o. female  Admission Orders:  Scheduled Medications:  Diclofenac Sodium, 2 g, Topical, 4x Daily  lidocaine, 1 patch, Topical, Daily  pantoprazole, 40 mg, Oral, Early Morning      Continuous IV Infusions:  lactated ringers, 125 mL/hr, Intravenous, Continuous      PRN Meds:  morphine injection, 2 mg, Intravenous, Q4H PRN  ondansetron, 4 mg, Intravenous, Q6H PRN        IP CONSULT TO GASTROENTEROLOGY    Network Utilization Review Department  ATTENTION: Please call with any questions or concerns to [phone] and carefully listen to the prompts so that you are directed to the right person. All voicemails are confidential.   For Discharge needs, contact Care Management DC Support Team at [phone] opt. 2  Send all requests for admission clinical reviews, approved  or denied determinations and any other requests to dedicated fax number below belonging to the campus where the patient is receiving treatment. List of dedicated fax numbers for the Facilities:  FACILITY NAME UR FAX NUMBER   ADMISSION DENIALS (Administrative/Medical Necessity) [phone]   DISCHARGE SUPPORT TEAM (NETWORK) [phone]   PARENT CHILD HEALTH (Maternity/NICU/Pediatrics) [phone]   [facility] [phone]   [facility] [phone]   [facility] [phone]   [facility] [phone]   [facility] [phone]   [facility] [phone]   [facility] [phone]   [facility] [phone]   [facility] [phone]   [facility] [phone]   [facility] [phone]   [facility] [phone]

## 2024-07-18 NOTE — CASE MANAGEMENT
Case Management Assessment & Discharge Planning Note    Patient name Neisha Bowles  Location /-01 MRN [id number]  : 2000 Date 2024       Current Admission Date: 2024  Current Admission Diagnosis:Colitis   Patient Active Problem List    Diagnosis Date Noted Date Diagnosed    Colitis 2024     Diarrhea 2024     Low back pain 2024     Rectal bleeding 2024     Partial deafness 2024     Congenital deafness 2021     Cochlear implant in place 2021     Mild depression 2021       LOS (days): 0  Geometric Mean LOS (GMLOS) (days):   Days to GMLOS:     OBJECTIVE:              Current admission status: Observation       Preferred Pharmacy:   [id number] - [facility] [place] - [address]  [address] [place] [address]  Phone: [phone] Fax: [phone]    [id number]  [place] - [address] [place] [address]  Phone: [phone] Fax: [phone]    Primary Care Provider: No primary care provider on file.    Primary Insurance: [facility]  Secondary Insurance:     ASSESSMENT:  Active Health Care Proxies    There are no active Health Care Proxies on file.       Advance Directives  Does patient have a Health Care POA?: No  Was patient offered paperwork?: Yes (declined)  Does patient currently have a Health Care decision maker?: Yes, please see Health Care Proxy section  Does patient have Advance Directives?: No  Was patient offered paperwork?: Yes (declined)  Primary Contact: Ivonne Bowles         Readmission Root Cause  30 Day Readmission: No    Patient Information  Admitted from:: Home  Mental Status: Alert  During Assessment patient was accompanied by: Not accompanied during assessment  Assessment information provided by:: Patient  Primary Caregiver: Self  Support Systems: Parent  County of Residence: [place]  What city do you live in?: [facility]  entry access options. Select all that apply.: Stairs  Number of steps to enter home.: 3  Type of Current Residence: 3 story home  Upon entering residence, is there a bedroom on the main floor (no further steps)?: No  A bedroom is located on the following floor levels of residence (select all that apply):: 2nd Floor  Number of steps to 2nd floor from main floor: One Flight  Living Arrangements: Lives w/ Parent(s)  Is patient a ?: No    Activities of Daily Living Prior to Admission  Functional Status: Independent  Completes ADLs independently?: Yes  Ambulates independently?: Yes  Does patient use assisted devices?: No  Does patient currently own DME?: No  Does patient have a history of Outpatient Therapy (PT/OT)?: Yes  Does the patient have a history of Short-Term Rehab?: No  Does patient have a history of HHC?: No  Does patient currently have HHC?: No         Patient Information Continued  Income Source: Employed  Does patient have prescription coverage?: Yes  Does patient receive dialysis treatments?: No  Does patient have a history of substance abuse?: No  Does patient have a history of Mental Health Diagnosis?: No    PHQ 2/9 Screening   Reviewed PHQ 2/9 Depression Screening Score?: No    Means of Transportation  Means of Transport to Appts:: Drives Self      Social Determinants of Health (SDOH)      Flowsheet Row Most Recent Value   Housing Stability    In the last 12 months, was there a time when you were not able to pay the mortgage or rent on time? N   In the past 12 months, how many times have you moved where you were living? 0   At any time in the past 12 months, were you homeless or living in a shelter (including now)? N   Transportation Needs    In the past 12 months, has lack of transportation kept you from medical appointments or from getting medications? no   In the past 12 months, has lack of transportation kept you from meetings, work, or from getting things needed for daily living? No   Food  Insecurity    Within the past 12 months, you worried that your food would run out before you got the money to buy more. Never true   Within the past 12 months, the food you bought just didn't last and you didn't have money to get more. Never true   Utilities    In the past 12 months has the electric, gas, oil, or water company threatened to shut off services in your home? No            DISCHARGE DETAILS:    Discharge planning discussed with:: Patient at bedside  Freedom of Choice: Yes  Comments - Freedom of Choice: CM discussed freedom of choice as it pertains to discharge planning. Patient declined having any rehab needs and refused needing any resources.  CM contacted family/caregiver?: No- see comments (AxO)  Were Treatment Team discharge recommendations reviewed with patient/caregiver?: Yes  Did patient/caregiver verbalize understanding of patient care needs?: Yes  Were patient/caregiver advised of the risks associated with not following Treatment Team discharge recommendations?: Yes         Requested Home Health Care         Is the patient interested in HHC at discharge?: No    DME Referral Provided  Referral made for DME?: No    Other Referral/Resources/Interventions Provided:  Interventions: Declines resources, None Indicated    Would you like to participate in our [facility] service program?  : No - Declined    Treatment Team Recommendation: Home  Discharge Destination Plan:: Home  Transport at Discharge : Other (Comment) (TBD)

## 2024-07-18 NOTE — ED PROVIDER NOTES
History  Chief Complaint   Patient presents with    Abdominal Pain     Pt presents with c/o abdominal pain, slight cough, diarrhea, subjective fever, chills, muscle aches. Started taking antibiotics this past Thursday after dog bite, symptoms began shortly after.      24-year-old female presents to the emergency department complaints abdominal pain, cough, myalgias, and diarrhea.  States that she has had symptoms over the past 5 days.  States that she had been bitten by a dog last week and started several days of Augmentin prior to discontinuing it secondary to GI side effects.  States that she had her sutures removed from her scalp and hand yesterday.  No complications of the wounds.      History provided by:  Patient   used: No    Abdominal Pain  Pain location:  RLQ and RUQ  Pain quality: aching and cramping    Pain severity:  Moderate  Onset quality:  Gradual  Duration:  4 days  Timing:  Constant  Progression:  Waxing and waning  Chronicity:  New  Relieved by:  Nothing  Ineffective treatments:  None tried  Associated symptoms: cough, diarrhea, fever and nausea    Associated symptoms: no anorexia, no belching, no chest pain, no chills, no constipation, no dysuria, no fatigue, no flatus, no hematemesis, no hematochezia, no hematuria, no melena, no shortness of breath, no sore throat and no vomiting        Prior to Admission Medications   Prescriptions Last Dose Informant Patient Reported? Taking?   amoxicillin-clavulanate (AUGMENTIN) 875-125 mg per tablet   No No   Sig: Take 1 tablet by mouth every 12 (twelve) hours for 7 days   Patient not taking: Reported on 7/15/2024   amoxicillin-clavulanate (AUGMENTIN) 875-125 mg per tablet   No No   Sig: Take 1 tablet by mouth every 12 (twelve) hours for 7 days   Patient not taking: Reported on 7/15/2024   ibuprofen (MOTRIN) 600 mg tablet  Self No No   Sig: Take 1 tablet (600 mg total) by mouth every 6 (six) hours as needed for moderate pain   Patient not  taking: Reported on 10/5/2022      Facility-Administered Medications: None       Past Medical History:   Diagnosis Date    Deaf, bilateral        Past Surgical History:   Procedure Laterality Date    COCHLEAR IMPLANT Right     WA LAPS FULG/EXC OVARY VISCERA/PERITONEAL SURFACE Left 3/18/2022    Procedure: LAPAROSCOPIC REMOVAL OF LEFT OVARIAN CYST;  Surgeon: Zainab Thomson MD;  Location: [facility] OR;  Service: Gynecology    REDUCTION MAMMAPLASTY         Family History   Problem Relation Age of Onset    Breast cancer Neg Hx     Ovarian cancer Neg Hx     Uterine cancer Neg Hx     Cervical cancer Neg Hx      I have reviewed and agree with the history as documented.    E-Cigarette/Vaping    E-Cigarette Use Never User      E-Cigarette/Vaping Substances    Nicotine No     THC No     CBD No     Flavoring No     Other No     Unknown No      Social History     Tobacco Use    Smoking status: Never    Smokeless tobacco: Never   Vaping Use    Vaping status: Never Used   Substance Use Topics    Alcohol use: Yes     Comment: wine-socially    Drug use: Never       Review of Systems   Constitutional:  Positive for fever. Negative for activity change, appetite change, chills and fatigue.   HENT:  Negative for congestion, dental problem, drooling, ear discharge, ear pain, mouth sores, nosebleeds, rhinorrhea, sore throat and trouble swallowing.    Eyes:  Negative for pain, discharge and itching.   Respiratory:  Positive for cough. Negative for chest tightness, shortness of breath and wheezing.    Cardiovascular:  Negative for chest pain and palpitations.   Gastrointestinal:  Positive for abdominal pain, diarrhea and nausea. Negative for anorexia, blood in stool, constipation, flatus, hematemesis, hematochezia, melena and vomiting.   Endocrine: Negative for cold intolerance and heat intolerance.   Genitourinary:  Negative for difficulty urinating, dysuria, flank pain, frequency, hematuria and urgency.   Skin:  Negative for rash and  wound.   Allergic/Immunologic: Negative for food allergies and immunocompromised state.   Neurological:  Negative for dizziness, seizures, syncope, weakness, numbness and headaches.   Psychiatric/Behavioral:  Negative for agitation, behavioral problems and confusion.        Physical Exam  Physical Exam  Vitals and nursing note reviewed.   Constitutional:       General: She is not in acute distress.     Appearance: She is not diaphoretic.   HENT:      Head: Normocephalic and atraumatic.      Right Ear: External ear normal.      Left Ear: External ear normal.      Mouth/Throat:      Mouth: Oropharynx is clear and moist.      Pharynx: No oropharyngeal exudate.   Eyes:      Conjunctiva/sclera: Conjunctivae normal.   Neck:      Vascular: No JVD.      Trachea: No tracheal deviation.   Cardiovascular:      Rate and Rhythm: Normal rate and regular rhythm.      Heart sounds: Normal heart sounds. No murmur heard.     No friction rub. No gallop.   Pulmonary:      Effort: Pulmonary effort is normal. No respiratory distress.      Breath sounds: Normal breath sounds. No wheezing or rales.   Chest:      Chest wall: No tenderness.   Abdominal:      General: Bowel sounds are normal. There is no distension.      Palpations: Abdomen is soft.      Tenderness: There is no abdominal tenderness. There is no guarding.   Musculoskeletal:         General: No tenderness, deformity or edema. Normal range of motion.   Lymphadenopathy:      Cervical: No cervical adenopathy.   Skin:     General: Skin is warm and dry.      Findings: No erythema or rash.   Neurological:      Mental Status: She is alert and oriented to person, place, and time.   Psychiatric:         Mood and Affect: Mood and affect and mood normal.         Behavior: Behavior normal.         Vital Signs  ED Triage Vitals   Temperature Pulse Respirations Blood Pressure SpO2   07/16/24 0636 07/16/24 0633 07/16/24 0633 07/16/24 0633 07/16/24 0633   (!) 101.7 °F (38.7 °C) (!) 110 20  122/87 97 %      Temp Source Heart Rate Source Patient Position - Orthostatic VS BP Location FiO2 (%)   07/16/24 0636 07/16/24 0633 07/16/24 0633 07/16/24 0633 --   Oral Monitor Sitting Right arm       Pain Score       07/16/24 1010       6           Vitals:    07/16/24 0633 07/16/24 1010   BP: 122/87 104/60   Pulse: (!) 110 91   Patient Position - Orthostatic VS: Sitting Lying         Visual Acuity      ED Medications  Medications   sodium chloride 0.9 % bolus 1,000 mL (0 mL Intravenous Stopped 7/16/24 0955)   acetaminophen (TYLENOL) tablet 975 mg (975 mg Oral Given 7/16/24 0745)   iohexol (OMNIPAQUE) 350 MG/ML injection (MULTI-DOSE) 100 mL (100 mL Intravenous Given 7/16/24 0933)       Diagnostic Studies  Results Reviewed       Procedure Component Value Units Date/Time    FLU/RSV/COVID - if FLU/RSV clinically relevant [[id number]]  (Normal) Collected: 07/16/24 0722    Lab Status: Final result Specimen: Nares from Nose Updated: 07/16/24 0809     SARS-CoV-2 Negative     INFLUENZA A PCR Negative     INFLUENZA B PCR Negative     RSV PCR Negative    Narrative:      FOR PEDIATRIC PATIENTS - copy/paste COVID Guidelines URL to browser: [email or web address]    SARS-CoV-2 assay is a Nucleic Acid Amplification assay intended for the  qualitative detection of nucleic acid from SARS-CoV-2 in nasopharyngeal  swabs. Results are for the presumptive identification of SARS-CoV-2 RNA.    Positive results are indicative of infection with SARS-CoV-2, the virus  causing COVID-19, but do not rule out bacterial infection or co-infection  with other viruses. Laboratories within the [place] and its  territories are required to report all positive results to the appropriate  public health authorities. Negative results do not preclude SARS-CoV-2  infection and should not be used as the sole basis for treatment or other  patient management decisions. Negative results must be combined  with  clinical observations, patient history, and epidemiological information.  This test has not been FDA cleared or approved.    This test has been authorized by FDA under an Emergency Use Authorization  (EUA). This test is only authorized for the duration of time the  declaration that circumstances exist justifying the authorization of the  emergency use of an in vitro diagnostic tests for detection of SARS-CoV-2  virus and/or diagnosis of COVID-19 infection under section 564(b)(1) of  the Act, 21 U.S.C. 360bbb-3(b)(1), unless the authorization is terminated  or revoked sooner. The test has been validated but independent review by FDA  and CLIA is pending.    Test performed using [employer] GeneXpert: This RT-PCR assay targets N2,  a region unique to SARS-CoV-2. A conserved region in the E-gene was chosen  for pan-Sarbecovirus detection which includes SARS-CoV-2.    According to CMS-2020-01-R, this platform meets the definition of high-throughput technology.    Strep A PCR [[id number]]  (Normal) Collected: 07/16/24 0722    Lab Status: Final result Specimen: Throat Updated: 07/16/24 0757     STREP A PCR Not Detected    Comprehensive metabolic panel [[id number]]  (Abnormal) Collected: 07/16/24 0722    Lab Status: Final result Specimen: Blood from Arm, Right Updated: 07/16/24 0750     Sodium 136 mmol/L      Potassium 3.8 mmol/L      Chloride 101 mmol/L      CO2 26 mmol/L      ANION GAP 9 mmol/L      BUN 6 mg/dL      Creatinine 0.77 mg/dL      Glucose 102 mg/dL      Calcium 9.3 mg/dL      AST 12 U/L      ALT 12 U/L      Alkaline Phosphatase 52 U/L      Total Protein 7.6 g/dL      Albumin 4.2 g/dL      Total Bilirubin 0.56 mg/dL      eGFR 108 ml/min/1.73sq m     Narrative:      National Kidney Disease Foundation guidelines for Chronic Kidney Disease (CKD):     Stage 1 with normal or high GFR (GFR > 90 mL/min/1.73 square meters)    Stage 2 Mild CKD (GFR = 60-89 mL/min/1.73 square meters)    Stage 3A Moderate CKD (GFR =  45-59 mL/min/1.73 square meters)    Stage 3B Moderate CKD (GFR = 30-44 mL/min/1.73 square meters)    Stage 4 Severe CKD (GFR = 15-29 mL/min/1.73 square meters)    Stage 5 End Stage CKD (GFR <15 mL/min/1.73 square meters)  Note: GFR calculation is accurate only with a steady state creatinine    Protime-INR [[id number]]  (Abnormal) Collected: 07/16/24 0722    Lab Status: Final result Specimen: Blood from Arm, Right Updated: 07/16/24 0747     Protime 15.0 seconds      INR 1.12    APTT [[id number]]  (Normal) Collected: 07/16/24 0722    Lab Status: Final result Specimen: Blood from Arm, Right Updated: 07/16/24 0747     PTT 30 seconds     Urine Microscopic [[id number]]  (Abnormal) Collected: 07/16/24 0721    Lab Status: Final result Specimen: Urine, Clean Catch Updated: 07/16/24 0740     RBC, UA 2-4 /hpf      WBC, UA 2-4 /hpf      Epithelial Cells Innumerable /hpf      Bacteria, UA None Seen /hpf      MUCUS THREADS Innumerable    UA w Reflex to Microscopic w Reflex to Culture [[id number]]  (Abnormal) Collected: 07/16/24 0721    Lab Status: Final result Specimen: Urine, Clean Catch Updated: 07/16/24 0738     Color, UA Yellow     Clarity, UA Turbid     Specific Gravity, UA 1.027     pH, UA 6.0     Leukocytes, UA Negative     Nitrite, UA Negative     Protein, UA 30 (1+) mg/dl      Glucose, UA Negative mg/dl      Ketones, UA 40 (2+) mg/dl      Urobilinogen, UA <2.0 mg/dl      Bilirubin, UA Negative     Occult Blood, UA Trace    CBC and differential [[id number]]  (Abnormal) Collected: 07/16/24 0722    Lab Status: Final result Specimen: Blood from Arm, Right Updated: 07/16/24 0731     WBC 11.36 Thousand/uL      RBC 4.82 Million/uL      Hemoglobin 14.1 g/dL      Hematocrit 41.7 %      MCV 87 fL      MCH 29.3 pg      MCHC 33.8 g/dL      RDW 11.4 %      MPV 9.0 fL      Platelets 265 Thousands/uL      nRBC 0 /100 WBCs      Segmented % 83 %      Immature Grans % 0 %      Lymphocytes % 9 %      Monocytes % 8 %      Eosinophils Relative 0  %      Basophils Relative 0 %      Absolute Neutrophils 9.36 Thousands/µL      Absolute Immature Grans 0.04 Thousand/uL      Absolute Lymphocytes 1.03 Thousands/µL      Absolute Monocytes 0.89 Thousand/µL      Eosinophils Absolute 0.01 Thousand/µL      Basophils Absolute 0.03 Thousands/µL     POCT pregnancy, urine [[id number]]  (Normal) Resulted: 07/16/24 0722    Lab Status: Final result Updated: 07/16/24 0722     EXT Preg Test, Ur Negative     Control Valid                   CT abdomen pelvis with contrast   Final Result by Liam Gipson MD (07/16 1000)      Mural thickening of the ascending colon potentially reflecting infectious or inflammatory colitis.         Workstation performed: [id number]         XR chest 2 views   ED Interpretation by Elizabeth Baugh PA-C (07/16 0733)   Clear lungs       Final Result by Dori Neal MD (07/16 0829)      No acute cardiopulmonary disease.            Workstation performed: [id number]                    Procedures  Procedures         ED Course                                               Medical Decision Making  Differential diagnosis includes but not limited to: Upper respiratory infection, pneumonia, colitis, infectious diarrhea, UTI, viral syndrome    Problems Addressed:  Colitis: acute illness or injury  Diarrhea: acute illness or injury    Amount and/or Complexity of Data Reviewed  Labs: ordered. Decision-making details documented in ED Course.  Radiology: ordered and independent interpretation performed. Decision-making details documented in ED Course.    Risk  OTC drugs.  Prescription drug management.                 Disposition  Final diagnoses:   Colitis   Diarrhea     Time reflects when diagnosis was documented in both MDM as applicable and the Disposition within this note       Time User Action Codes Description Comment    7/16/2024 11:10 AM Elizabeth Baugh Add [K52.9] Colitis     7/16/2024 11:24 AM Elizabeth Baugh Add [R19.7] Diarrhea           ED  Disposition       ED Disposition   Discharge    Condition   Stable    Date/Time   Tue Jul 16, 2024 11:10 AM    Comment   Neisha Bowles discharge to home/self care.                   Follow-up Information    None         Discharge Medication List as of 7/16/2024 11:15 AM        START taking these medications    Details   ciprofloxacin (CIPRO) 500 mg tablet Take 1 tablet (500 mg total) by mouth 2 (two) times a day for 10 days, Starting Tue 7/16/2024, Until Fri 7/26/2024, Normal      metroNIDAZOLE (FLAGYL) 500 mg tablet Take 1 tablet (500 mg total) by mouth every 8 (eight) hours for 10 days, Starting Tue 7/16/2024, Until Fri 7/26/2024, Normal           CONTINUE these medications which have NOT CHANGED    Details   !! amoxicillin-clavulanate (AUGMENTIN) 875-125 mg per tablet Take 1 tablet by mouth every 12 (twelve) hours for 7 days, Starting Wed 7/10/2024, Until Wed 7/17/2024, Normal      !! amoxicillin-clavulanate (AUGMENTIN) 875-125 mg per tablet Take 1 tablet by mouth every 12 (twelve) hours for 7 days, Starting Wed 7/10/2024, Until Wed 7/17/2024, Normal      ibuprofen (MOTRIN) 600 mg tablet Take 1 tablet (600 mg total) by mouth every 6 (six) hours as needed for moderate pain, Starting Fri 3/18/2022, Normal       !! - Potential duplicate medications found. Please discuss with provider.          Outpatient Discharge Orders   Stool Enteric Bacterial Panel by PCR   Standing Status: Future Standing Exp. Date: 07/16/25     Clostridium difficile toxin by PCR with EIA   Standing Status: Future Standing Exp. Date: 07/16/25       PDMP Review         Value Time User    PDMP Reviewed  Yes 3/18/2022  2:44 PM Zainab Thomson MD            ED Provider  Electronically Signed by             Elizabeth Baugh PA-C  07/18/24 1275

## 2024-07-18 NOTE — PROGRESS NOTES
[facility]  Progress Note  Name: Neisha Bowles I MRN: [id number]  Unit/Bed#: -01I Date of Admission: 7/17/2024   Date of Service: 7/17/2024  I Hospital Day: 0    * Colitis  Assessment & Plan  Patient with abdominal pain frequent bloody diarrhea returns to ED for further evaluation; recent Augmentin course following dog bite initiated 7/10/24  CT imaging:Mural thickening of the ascending colon potentially reflecting infectious or inflammatory colitis.   , ESR 37   GI consulted  No plan for colonoscopy at this time  IV fluids  Stool studies  Per GI for now hold off on antibiotics  Hemoglobin is stable will monitor closely every 8 hours    Diarrhea  Assessment & Plan  Plan as above    Rectal bleeding  Assessment & Plan  Monitor H&H, stable     Low back pain  Assessment & Plan  Patient reporting low back pain from bending over so much due to her abdominal pain  Will add Voltaren cream and Lidoderm patch    Partial deafness  Assessment & Plan  Patient wears hearing aids at baseline has adequate supplies to keep them charged   Supportive care          VTE Pharmacologic Prophylaxis: VTE Score: 2 Low Risk (Score 0-2) - Encourage Ambulation.    Mobility:   Basic Mobility Inpatient Raw Score: 23  JH-HLM Goal: 7: Walk 25 feet or more  JH-HLM Achieved: 7: Walk 25 feet or more  JH-HLM Goal achieved. Continue to encourage appropriate mobility.    Patient Centered Rounds: I performed bedside rounds with nursing staff today.   Discussions with Specialists or Other Care Team Provider: GI    Education and Discussions with Family / Patient: Patient declined call to .     Total Time Spent on Date of Encounter in care of patient: 40 mins. This time was spent on one or more of the following: performing physical exam; counseling and coordination of care; obtaining or reviewing history; documenting in the medical record; reviewing/ordering tests, medications or procedures; communicating  with other healthcare professionals and discussing with patient's family/caregivers.    Current Length of Stay: 0 day(s)  Current Patient Status: Observation   Certification Statement: The patient, admitted on an observation basis, will now require > 2 midnight hospital stay due to ongoing monitoring/management diarrhea and colitis   Discharge Plan: Anticipate discharge tomorrow to home. If continued improvement     Code Status: Level 1 - Full Code    Subjective:   Patient seen this am, some improvement in stools, no blood this am. No fevers or chills. Denies nausea/vomiting. Discussed need for scope at some point - TBD inpatient vs outpatient.     Objective:     Vitals:   Temp (24hrs), Av.7 °F (36.5 °C), Min:97.7 °F (36.5 °C), Max:97.7 °F (36.5 °C)    Temp:  [97.7 °F (36.5 °C)] 97.7 °F (36.5 °C)  HR:  [77-90] 77  Resp:  [16-17] 16  BP: (106-147)/(64-77) 118/67  SpO2:  [96 %-100 %] 100 %  Body mass index is 26.49 kg/m².     Input and Output Summary (last 24 hours):     Intake/Output Summary (Last 24 hours) at 2024 0759  Last data filed at 2024 0519  Gross per 24 hour   Intake 3600 ml   Output 100 ml   Net 3500 ml       Physical Exam:   Physical Exam  Vitals and nursing note reviewed.   Constitutional:       General: She is awake. She is not in acute distress.     Appearance: Normal appearance. She is well-developed, well-groomed and normal weight. She is not ill-appearing or toxic-appearing.   Cardiovascular:      Rate and Rhythm: Normal rate and regular rhythm.      Heart sounds: Normal heart sounds.   Pulmonary:      Effort: Pulmonary effort is normal. No respiratory distress.      Breath sounds: Normal breath sounds. No wheezing.   Abdominal:      General: Bowel sounds are normal. There is no distension.      Palpations: Abdomen is soft.      Tenderness: There is no abdominal tenderness. There is no guarding.   Skin:     General: Skin is warm and dry.      Coloration: Skin is not pale.    Neurological:      Mental Status: She is alert and oriented to person, place, and time.   Psychiatric:         Mood and Affect: Mood normal.         Behavior: Behavior normal. Behavior is cooperative.           Additional Data:     Labs:  Results from last 7 days   Lab Units 07/18/24  0006 07/17/24  1611 07/17/24  1154   WBC Thousand/uL 8.14  --  9.35   HEMOGLOBIN g/dL 11.7   < > 13.6   HEMATOCRIT % 33.9*   < > 40.2   PLATELETS Thousands/uL 228  --  241   SEGS PCT %  --   --  79*   LYMPHO PCT %  --   --  10*   MONO PCT %  --   --  9   EOS PCT %  --   --  1    < > = values in this interval not displayed.     Results from last 7 days   Lab Units 07/18/24  0006 07/17/24  1154 07/16/24  0722   SODIUM mmol/L 136   < > 136   POTASSIUM mmol/L 4.1   < > 3.8   CHLORIDE mmol/L 107   < > 101   CO2 mmol/L 24   < > 26   BUN mg/dL 5   < > 6   CREATININE mg/dL 0.63   < > 0.77   ANION GAP mmol/L 5   < > 9   CALCIUM mg/dL 8.5   < > 9.3   ALBUMIN g/dL  --   --  4.2   TOTAL BILIRUBIN mg/dL  --   --  0.56   ALK PHOS U/L  --   --  52   ALT U/L  --   --  12   AST U/L  --   --  12*   GLUCOSE RANDOM mg/dL 88   < > 102    < > = values in this interval not displayed.     Results from last 7 days   Lab Units 07/16/24  0722   INR  1.12                   Lines/Drains:  Invasive Devices       Peripheral Intravenous Line  Duration             Peripheral IV 07/17/24 Right Antecubital <1 day                          Imaging: No pertinent imaging reviewed.    Recent Cultures (last 7 days):         Last 24 Hours Medication List:   Current Facility-Administered Medications   Medication Dose Route Frequency Provider Last Rate    Diclofenac Sodium  2 g Topical 4x Daily DANYA Dumont      lactated ringers  125 mL/hr Intravenous Continuous DANYA Dumont 125 mL/hr (07/18/24 0519)    lidocaine  1 patch Topical Daily DANYA Dumont      morphine injection  2 mg Intravenous Q4H PRN DANYA Dumont      ondansetron  4 mg Intravenous  Q6H PRN DANYA Dumont      pantoprazole  40 mg Oral Early Morning DANYA Dumont          Today, Patient Was Seen By: Rachel Hernandez PA-C    **Please Note: This note may have been constructed using a voice recognition system.**

## 2024-07-18 NOTE — DISCHARGE SUMMARY
[facility]  Discharge Summary  Name: Neisha Bowles I MRN: [id number]  Unit/Bed#: -01I Date of Admission: 7/17/2024   Date of Service: 7/17/2024  I Hospital Day: 0    * Colitis  Assessment & Plan  Patient with abdominal pain frequent bloody diarrhea returns to ED for further evaluation; recent Augmentin course following dog bite initiated 7/10/24  CT imaging:Mural thickening of the ascending colon potentially reflecting infectious or inflammatory colitis.   , ESR 37   GI consulted  No plan for colonoscopy at this time  IV fluids  Stool studies  Per GI for now hold off on antibiotics  Hemoglobin is stable will monitor closely every 8 hours    Diarrhea  Assessment & Plan  Plan as above    Rectal bleeding  Assessment & Plan  Monitor H&H, stable     Low back pain  Assessment & Plan  Patient reporting low back pain from bending over so much due to her abdominal pain  Will add Voltaren cream and Lidoderm patch    Partial deafness  Assessment & Plan  Patient wears hearing aids at baseline has adequate supplies to keep them charged   Supportive care         Medical Problems       Resolved Problems  Date Reviewed: 7/18/2024   None       Discharging Physician / Practitioner: Rachel Hernandez PA-C  PCP: No primary care provider on file.  Admission Date:   Admission Orders (From admission, onward)       Ordered        07/17/24 1233  Place in Observation  Once                          Discharge Date: 07/18/24    Consultations During Hospital Stay:  IP CONSULT TO GASTROENTEROLOGY    Procedures Performed:   ***    Significant Findings / Test Results:   CT abdomen pelvis with contrast    Result Date: 7/16/2024  Mural thickening of the ascending colon potentially reflecting infectious or inflammatory colitis. Workstation performed: [id number]     XR chest 2 views    Result Date: 7/16/2024  No acute cardiopulmonary disease. Workstation performed: [id number]     ***    Incidental Findings:   ***  "  {SLIM Discharge Incidential Findings:07057}    Test Results Pending at Discharge (will require follow up):   ***     Outpatient Tests Requested:  ***    Complications:  ***    Reason for Admission: ***    Hospital Course:   Neisha Bowles is a 24 y.o. female patient with  has a past medical history of Deaf, bilateral. who originally presented to the hospital on 7/17/2024 due to Urinary Retention (States \"has not urinated since yesterday at 6 am. Having abdominal pain and continues having rectal bleeding. Dx with colitis\" Bladder scan 30 ml ). ***      {Complete this smartphrase if the patient is an inpatient and being discharged earlier than 2 midnights. If this does not apply, please delete this line:97958}    Please see above list of diagnoses and related plan for additional information.     Condition at Discharge: {Condition:83883}    Discharge Day Visit / Exam:   * Please refer to separate progress note for these details *    Discussion with Family: {Family Communication:73909}    Discharge instructions/Information to patient and family:   See after visit summary for information provided to patient and family.      Provisions for Follow-Up Care:  See after visit summary for information related to follow-up care and any pertinent home health orders.      Mobility at time of Discharge:   Basic Mobility Inpatient Raw Score: 24  JH-HLM Goal: 8: Walk 250 feet or more  JH-HLM Achieved: 3: Sit at edge of bed  {Mobility:29705}     Disposition:   {Disposition on Discharge:31396}    Planned Readmission: ***     Discharge Statement:  I spent *** minutes discharging the patient. This time was spent on the day of discharge. I had direct contact with the patient on the day of discharge. Greater than 50% of the total time was spent examining patient, answering all patient questions, arranging and discussing plan of care with patient as well as directly providing post-discharge instructions.  Additional time then spent on " discharge activities.    Discharge Medications:  See after visit summary for reconciled discharge medications provided to patient and/or family.      **Please Note: This note may have been constructed using a voice recognition system**

## 2024-07-18 NOTE — PROGRESS NOTES
Progress Note  Neisha Bowles 24 y.o. female MRN: [id number]  Unit/Bed#: -01 Encounter: [id number]    Subjective:  Patient lying in bed comfortably reporting improvement in symptoms.  Patient reports 1 loose stool so far today.  Patient is going to attempt her breakfast.  Patient does report abdominal pain but is improved.  Objective:     Vitals:   Vitals:    07/18/24 0700   BP: 118/67   Pulse: 77   Resp: 16   Temp:    SpO2: 100%   ,Body mass index is 26.49 kg/m².      Intake/Output Summary (Last 24 hours) at 7/18/2024 1008  Last data filed at 7/18/2024 0519  Gross per 24 hour   Intake 3600 ml   Output 100 ml   Net 3500 ml       Physical Exam:     General Appearance: Alert, appears stated age and cooperative  Lungs: Clear to auscultation bilaterally, no rales or rhonchi, no labored breathing/accessory muscle use  Heart: Regular rate and rhythm, S1, S2 normal, no murmur, click, rub or gallop  Abdomen: Soft, non-tender, non-distended; bowel sounds normal; no masses or no organomegaly  Extremities: No cyanosis, edema    Invasive Devices       Peripheral Intravenous Line  Duration             Peripheral IV 07/17/24 Right Antecubital <1 day                    Lab Results:  Admission on 07/17/2024   Component Date Value    WBC 07/17/2024 9.35     RBC 07/17/2024 4.64     Hemoglobin 07/17/2024 13.6     Hematocrit 07/17/2024 40.2     MCV 07/17/2024 87     MCH 07/17/2024 29.3     MCHC 07/17/2024 33.8     RDW 07/17/2024 11.6     MPV 07/17/2024 8.8 (L)     Platelets 07/17/2024 241     nRBC 07/17/2024 0     Segmented % 07/17/2024 79 (H)     Immature Grans % 07/17/2024 1     Lymphocytes % 07/17/2024 10 (L)     Monocytes % 07/17/2024 9     Eosinophils Relative 07/17/2024 1     Basophils Relative 07/17/2024 0     Absolute Neutrophils 07/17/2024 7.41     Absolute Immature Grans 07/17/2024 0.06     Absolute Lymphocytes 07/17/2024 0.97     Absolute Monocytes 07/17/2024 0.81     Eosinophils Absolute 07/17/2024 0.08     Basophils  Absolute 07/17/2024 0.02     Sodium 07/17/2024 135     Potassium 07/17/2024 3.9     Chloride 07/17/2024 101     CO2 07/17/2024 24     ANION GAP 07/17/2024 10     BUN 07/17/2024 7     Creatinine 07/17/2024 0.66     Glucose 07/17/2024 80     Calcium 07/17/2024 9.0     eGFR 07/17/2024 124     Sed Rate 07/17/2024 37 (H)     CRP 07/17/2024 110.7 (H)     Hemoglobin 07/17/2024 12.5     Hematocrit 07/17/2024 37.6     Sodium 07/18/2024 136     Potassium 07/18/2024 4.1     Chloride 07/18/2024 107     CO2 07/18/2024 24     ANION GAP 07/18/2024 5     BUN 07/18/2024 5     Creatinine 07/18/2024 0.63     Glucose 07/18/2024 88     Glucose, Fasting 07/18/2024 88     Calcium 07/18/2024 8.5     eGFR 07/18/2024 125     CRP 07/18/2024 82.6 (H)     WBC 07/18/2024 8.14     RBC 07/18/2024 3.95     Hemoglobin 07/18/2024 11.7     Hematocrit 07/18/2024 33.9 (L)     MCV 07/18/2024 86     MCH 07/18/2024 29.6     MCHC 07/18/2024 34.5     RDW 07/18/2024 11.7     Platelets 07/18/2024 228     MPV 07/18/2024 9.0     Hemoglobin 07/18/2024 11.4 (L)     Hematocrit 07/18/2024 33.3 (L)        Imaging Studies:   I have personally reviewed pertinent imaging studies.    CT abdomen pelvis with contrast    Result Date: 7/16/2024  Narrative: CT ABDOMEN AND PELVIS WITH IV CONTRAST INDICATION: pain. COMPARISON: Ultrasound 11/11/2021 TECHNIQUE: CT examination of the abdomen and pelvis was performed. Multiplanar 2D reformatted images were created from the source data. This examination, like all CT scans performed in the [facility], was performed utilizing techniques to minimize radiation dose exposure, including the use of iterative reconstruction and automated exposure control. Radiation dose length product (DLP) for this visit: 663 mGy-cm IV Contrast: 100 mL of iohexol (OMNIPAQUE) Enteric Contrast: Not administered. FINDINGS: ABDOMEN LOWER CHEST: No clinically significant abnormality in the visualized lower chest. LIVER/BILIARY TREE:  Unremarkable. GALLBLADDER: No calcified gallstones. No pericholecystic inflammatory change. SPLEEN: Unremarkable. PANCREAS: Unremarkable. ADRENAL GLANDS: Unremarkable. KIDNEYS/URETERS: No hydronephrosis or urinary tract calculi. Subcentimeter hypoattenuating renal lesion(s), too small to characterize but statistically likely benign, which do not warrant follow-up (Radiology June 2019). STOMACH AND BOWEL: Mural thickening of the ascending colon may reflect an infectious or inflammatory colitis. APPENDIX: No findings to suggest appendicitis. ABDOMINOPELVIC CAVITY: Small volume of free pelvic fluid, likely physiologic given the patient's age and gender. No pneumoperitoneum. No lymphadenopathy. VESSELS: Unremarkable for patient's age. PELVIS REPRODUCTIVE ORGANS: Unremarkable for patient's age. URINARY BLADDER: Unremarkable. ABDOMINAL WALL/INGUINAL REGIONS: Unremarkable. BONES: No acute fracture or suspicious osseous lesion.     Impression: Mural thickening of the ascending colon potentially reflecting infectious or inflammatory colitis. Workstation performed: [id number]     XR chest 2 views    Result Date: 7/16/2024  Narrative: XR CHEST PA & LATERAL INDICATION: cough. COMPARISON: None FINDINGS: Clear lungs. No pneumothorax or pleural effusion. Normal cardiomediastinal silhouette. Bones are unremarkable for age. Normal upper abdomen.     Impression: No acute cardiopulmonary disease. Workstation performed: [id number]         Assessment & Plan  Diarrhea  Abdominal pain  Colitis on CT  Rectal bleeding  -Patient presents back to the [facility] emergency department today with a chief complaint of diarrhea, abdominal pain, rectal bleeding.  -7/16/2024 CTAP shows ascending colitis.  -Hemoglobin 11.4  -CRP 82.6 down from 110.7  -Stool for enteric pathogen and C. difficile pending.  -Patient had been on Augmentin therapy for a dog bite starting on 7/10/2024.  -Serial abdominal exams  -IV fluids  -Continue to trend  CBC  -Continue to monitor vital signs  -No plans for colonoscopy at this time.  -Diet as tolerated.  -Will hold off on antibiotic use at this time.     Patient be seen and examined by Dr. Carnes.          Shaista Nino PA-C  7/18/2024,10:08 AM

## 2024-07-18 NOTE — PLAN OF CARE
Problem: PAIN - ADULT  Goal: Verbalizes/displays adequate comfort level or baseline comfort level  Description: Interventions:  - Encourage patient to monitor pain and request assistance  - Assess pain using appropriate pain scale  - Administer analgesics based on type and severity of pain and evaluate response  - Implement non-pharmacological measures as appropriate and evaluate response  - Consider cultural and social influences on pain and pain management  - Notify physician/advanced practitioner if interventions unsuccessful or patient reports new pain  Outcome: Progressing     Problem: INFECTION - ADULT  Goal: Absence or prevention of progression during hospitalization  Description: INTERVENTIONS:  - Assess and monitor for signs and symptoms of infection  - Monitor lab/diagnostic results  - Monitor all insertion sites, i.e. indwelling lines, tubes, and drains  - Monitor endotracheal if appropriate and nasal secretions for changes in amount and color  - [place] appropriate cooling/warming therapies per order  - Administer medications as ordered  - Instruct and encourage patient and family to use good hand hygiene technique  - Identify and instruct in appropriate isolation precautions for identified infection/condition  Outcome: Progressing  Goal: Absence of fever/infection during neutropenic period  Description: INTERVENTIONS:  - Monitor WBC    Outcome: Progressing     Problem: SAFETY ADULT  Goal: Patient will remain free of falls  Description: INTERVENTIONS:  - Educate patient/family on patient safety including physical limitations  - Instruct patient to call for assistance with activity   - Consult OT/PT to assist with strengthening/mobility   - Keep Call bell within reach  - Keep bed low and locked with side rails adjusted as appropriate  - Keep care items and personal belongings within reach  - Initiate and maintain comfort rounds  - Make Fall Risk Sign visible to staff  - Offer Toileting every  Hours,  in advance of need  - Initiate/Maintain alarm  - Obtain necessary fall risk management equipment:   - Apply yellow socks and bracelet for high fall risk patients  - Consider moving patient to room near nurses station  Outcome: Progressing  Goal: Maintain or return to baseline ADL function  Description: INTERVENTIONS:  -  Assess patient's ability to carry out ADLs; assess patient's baseline for ADL function and identify physical deficits which impact ability to perform ADLs (bathing, care of mouth/teeth, toileting, grooming, dressing, etc.)  - Assess/evaluate cause of self-care deficits   - Assess range of motion  - Assess patient's mobility; develop plan if impaired  - Assess patient's need for assistive devices and provide as appropriate  - Encourage maximum independence but intervene and supervise when necessary  - Involve family in performance of ADLs  - Assess for home care needs following discharge   - Consider OT consult to assist with ADL evaluation and planning for discharge  - Provide patient education as appropriate  Outcome: Progressing  Goal: Maintains/Returns to pre admission functional level  Description: INTERVENTIONS:  - Perform AM-PAC 6 Click Basic Mobility/ Daily Activity assessment daily.  - Set and communicate daily mobility goal to care team and patient/family/caregiver.   - Collaborate with rehabilitation services on mobility goals if consulted  - Perform Range of Motion times a day.  - Reposition patient every  hours.  - Dangle patient  times a day  - Stand patient  times a day  - Ambulate patient  times a day  - Out of bed to chair times a day   - Out of bed for meals  times a day  - Out of bed for toileting  - Record patient progress and toleration of activity level   Outcome: Progressing     Problem: DISCHARGE PLANNING  Goal: Discharge to home or other facility with appropriate resources  Description: INTERVENTIONS:  - Identify barriers to discharge w/patient and caregiver  - Arrange for  needed discharge resources and transportation as appropriate  - Identify discharge learning needs (meds, wound care, etc.)  - Arrange for interpretive services to assist at discharge as needed  - Refer to Case Management Department for coordinating discharge planning if the patient needs post-hospital services based on physician/advanced practitioner order or complex needs related to functional status, cognitive ability, or social support system  Outcome: Progressing     Problem: Knowledge Deficit  Goal: Patient/family/caregiver demonstrates understanding of disease process, treatment plan, medications, and discharge instructions  Description: Complete learning assessment and assess knowledge base.  Interventions:  - Provide teaching at level of understanding  - Provide teaching via preferred learning methods  Outcome: Progressing

## 2024-07-21 LAB — CULTURE ID FROM ENTERIC PCR: NORMAL

## 2024-07-22 LAB — CALPROTECTIN STL-MCNC: 1150 ÂΜG/G

## 2024-08-05 ENCOUNTER — APPOINTMENT (EMERGENCY)
Dept: CT IMAGING | Facility: HOSPITAL | Age: 24
DRG: 372 | End: 2024-08-05
Payer: COMMERCIAL

## 2024-08-05 ENCOUNTER — HOSPITAL ENCOUNTER (INPATIENT)
Facility: HOSPITAL | Age: 24
LOS: 1 days | Discharge: HOME | DRG: 372 | End: 2024-08-07
Attending: EMERGENCY MEDICINE | Admitting: STUDENT IN AN ORGANIZED HEALTH CARE EDUCATION/TRAINING PROGRAM
Payer: COMMERCIAL

## 2024-08-05 DIAGNOSIS — R10.30 LOWER ABDOMINAL PAIN: ICD-10-CM

## 2024-08-05 DIAGNOSIS — K92.1 HEMATOCHEZIA: ICD-10-CM

## 2024-08-05 DIAGNOSIS — K62.5 RECTAL BLEEDING: ICD-10-CM

## 2024-08-05 DIAGNOSIS — Z86.19: ICD-10-CM

## 2024-08-05 DIAGNOSIS — R10.9 ABDOMINAL PAIN: Primary | ICD-10-CM

## 2024-08-05 DIAGNOSIS — R19.7 DIARRHEA, UNSPECIFIED TYPE: ICD-10-CM

## 2024-08-05 DIAGNOSIS — K52.9 COLITIS: ICD-10-CM

## 2024-08-05 LAB
ALBUMIN SERPL BCG-MCNC: 4 G/DL (ref 3.5–5)
ALP SERPL-CCNC: 51 U/L (ref 34–104)
ALT SERPL W P-5'-P-CCNC: 11 U/L (ref 7–52)
ANION GAP SERPL CALCULATED.3IONS-SCNC: 7 MMOL/L (ref 4–13)
AST SERPL W P-5'-P-CCNC: 10 U/L (ref 13–39)
ATRIAL RATE: 62 BPM
BASOPHILS # BLD AUTO: 0.01 THOUSANDS/ÂΜL (ref 0–0.1)
BASOPHILS NFR BLD AUTO: 0 % (ref 0–1)
BILIRUB SERPL-MCNC: 0.6 MG/DL (ref 0.2–1)
BILIRUB UR QL STRIP: NEGATIVE
BUN SERPL-MCNC: 6 MG/DL (ref 5–25)
CALCIUM SERPL-MCNC: 9.2 MG/DL (ref 8.4–10.2)
CHLORIDE SERPL-SCNC: 106 MMOL/L (ref 96–108)
CLARITY UR: NORMAL
CO2 SERPL-SCNC: 26 MMOL/L (ref 21–32)
COLOR UR: NORMAL
CREAT SERPL-MCNC: 0.64 MG/DL (ref 0.6–1.3)
EOSINOPHIL # BLD AUTO: 0.12 THOUSAND/ÂΜL (ref 0–0.61)
EOSINOPHIL NFR BLD AUTO: 2 % (ref 0–6)
ERYTHROCYTE [DISTWIDTH] IN BLOOD BY AUTOMATED COUNT: 12.2 % (ref 11.6–15.1)
EXT PREGNANCY TEST URINE: NEGATIVE
EXT. CONTROL: NORMAL
GFR SERPL CREATININE-BSD FRML MDRD: 125 ML/MIN/1.73SQ M
GLUCOSE SERPL-MCNC: 86 MG/DL (ref 65–140)
GLUCOSE UR STRIP-MCNC: NEGATIVE MG/DL
HCT VFR BLD AUTO: 38.8 % (ref 34.8–46.1)
HGB BLD-MCNC: 12.7 G/DL (ref 11.5–15.4)
HGB UR QL STRIP.AUTO: NEGATIVE
IMM GRANULOCYTES # BLD AUTO: 0.02 THOUSAND/UL (ref 0–0.2)
IMM GRANULOCYTES NFR BLD AUTO: 0 % (ref 0–2)
KETONES UR STRIP-MCNC: NEGATIVE MG/DL
LEUKOCYTE ESTERASE UR QL STRIP: NEGATIVE
LYMPHOCYTES # BLD AUTO: 1 THOUSANDS/ÂΜL (ref 0.6–4.47)
LYMPHOCYTES NFR BLD AUTO: 19 % (ref 14–44)
MCH RBC QN AUTO: 28.9 PG (ref 26.8–34.3)
MCHC RBC AUTO-ENTMCNC: 32.7 G/DL (ref 31.4–37.4)
MCV RBC AUTO: 88 FL (ref 82–98)
MONOCYTES # BLD AUTO: 0.61 THOUSAND/ÂΜL (ref 0.17–1.22)
MONOCYTES NFR BLD AUTO: 12 % (ref 4–12)
NEUTROPHILS # BLD AUTO: 3.43 THOUSANDS/ÂΜL (ref 1.85–7.62)
NEUTS SEG NFR BLD AUTO: 67 % (ref 43–75)
NITRITE UR QL STRIP: NEGATIVE
NRBC BLD AUTO-RTO: 0 /100 WBCS
P AXIS: 21 DEGREES
PH UR STRIP.AUTO: 6 [PH]
PLATELET # BLD AUTO: 249 THOUSANDS/UL (ref 149–390)
PMV BLD AUTO: 9.4 FL (ref 8.9–12.7)
POTASSIUM SERPL-SCNC: 3.5 MMOL/L (ref 3.5–5.3)
PR INTERVAL: 170 MS
PROT SERPL-MCNC: 6.5 G/DL (ref 6.4–8.4)
PROT UR STRIP-MCNC: NEGATIVE MG/DL
QRS AXIS: 68 DEGREES
QRSD INTERVAL: 80 MS
QT INTERVAL: 404 MS
QTC INTERVAL: 410 MS
RBC # BLD AUTO: 4.39 MILLION/UL (ref 3.81–5.12)
SODIUM SERPL-SCNC: 139 MMOL/L (ref 135–147)
SP GR UR STRIP.AUTO: 1.01 (ref 1–1.03)
T WAVE AXIS: 31 DEGREES
UROBILINOGEN UR STRIP-ACNC: <2 MG/DL
VENTRICULAR RATE: 62 BPM
WBC # BLD AUTO: 5.19 THOUSAND/UL (ref 4.31–10.16)

## 2024-08-05 PROCEDURE — 96361 HYDRATE IV INFUSION ADD-ON: CPT

## 2024-08-05 PROCEDURE — 96375 TX/PRO/DX INJ NEW DRUG ADDON: CPT

## 2024-08-05 PROCEDURE — 81003 URINALYSIS AUTO W/O SCOPE: CPT | Performed by: PHYSICIAN ASSISTANT

## 2024-08-05 PROCEDURE — 80053 COMPREHEN METABOLIC PANEL: CPT | Performed by: PHYSICIAN ASSISTANT

## 2024-08-05 PROCEDURE — 96374 THER/PROPH/DIAG INJ IV PUSH: CPT

## 2024-08-05 PROCEDURE — 93005 ELECTROCARDIOGRAM TRACING: CPT

## 2024-08-05 PROCEDURE — 99223 1ST HOSP IP/OBS HIGH 75: CPT | Performed by: STUDENT IN AN ORGANIZED HEALTH CARE EDUCATION/TRAINING PROGRAM

## 2024-08-05 PROCEDURE — 36415 COLL VENOUS BLD VENIPUNCTURE: CPT | Performed by: PHYSICIAN ASSISTANT

## 2024-08-05 PROCEDURE — 81025 URINE PREGNANCY TEST: CPT | Performed by: PHYSICIAN ASSISTANT

## 2024-08-05 PROCEDURE — 99285 EMERGENCY DEPT VISIT HI MDM: CPT | Performed by: PHYSICIAN ASSISTANT

## 2024-08-05 PROCEDURE — 85025 COMPLETE CBC W/AUTO DIFF WBC: CPT | Performed by: PHYSICIAN ASSISTANT

## 2024-08-05 PROCEDURE — 74177 CT ABD & PELVIS W/CONTRAST: CPT

## 2024-08-05 PROCEDURE — 99284 EMERGENCY DEPT VISIT MOD MDM: CPT

## 2024-08-05 PROCEDURE — 93010 ELECTROCARDIOGRAM REPORT: CPT | Performed by: INTERNAL MEDICINE

## 2024-08-05 RX ORDER — SODIUM CHLORIDE, SODIUM GLUCONATE, SODIUM ACETATE, POTASSIUM CHLORIDE, MAGNESIUM CHLORIDE, SODIUM PHOSPHATE, DIBASIC, AND POTASSIUM PHOSPHATE .53; .5; .37; .037; .03; .012; .00082 G/100ML; G/100ML; G/100ML; G/100ML; G/100ML; G/100ML; G/100ML
100 INJECTION, SOLUTION INTRAVENOUS CONTINUOUS
Status: DISPENSED | OUTPATIENT
Start: 2024-08-05 | End: 2024-08-06

## 2024-08-05 RX ORDER — METRONIDAZOLE 500 MG/1
500 TABLET ORAL EVERY 8 HOURS SCHEDULED
Status: DISCONTINUED | OUTPATIENT
Start: 2024-08-05 | End: 2024-08-06

## 2024-08-05 RX ORDER — CIPROFLOXACIN 2 MG/ML
400 INJECTION, SOLUTION INTRAVENOUS EVERY 12 HOURS
Status: DISCONTINUED | OUTPATIENT
Start: 2024-08-05 | End: 2024-08-06

## 2024-08-05 RX ORDER — KETOROLAC TROMETHAMINE 30 MG/ML
15 INJECTION, SOLUTION INTRAMUSCULAR; INTRAVENOUS ONCE
Status: COMPLETED | OUTPATIENT
Start: 2024-08-05 | End: 2024-08-05

## 2024-08-05 RX ORDER — ONDANSETRON 2 MG/ML
4 INJECTION INTRAMUSCULAR; INTRAVENOUS ONCE
Status: COMPLETED | OUTPATIENT
Start: 2024-08-05 | End: 2024-08-05

## 2024-08-05 RX ADMIN — METRONIDAZOLE 500 MG: 500 TABLET ORAL at 16:20

## 2024-08-05 RX ADMIN — SODIUM CHLORIDE 1000 ML: 0.9 INJECTION, SOLUTION INTRAVENOUS at 11:10

## 2024-08-05 RX ADMIN — KETOROLAC TROMETHAMINE 15 MG: 30 INJECTION, SOLUTION INTRAMUSCULAR at 11:11

## 2024-08-05 RX ADMIN — SODIUM CHLORIDE, SODIUM GLUCONATE, SODIUM ACETATE, POTASSIUM CHLORIDE, MAGNESIUM CHLORIDE, SODIUM PHOSPHATE, DIBASIC, AND POTASSIUM PHOSPHATE 100 ML/HR: .53; .5; .37; .037; .03; .012; .00082 INJECTION, SOLUTION INTRAVENOUS at 16:37

## 2024-08-05 RX ADMIN — IOHEXOL 100 ML: 350 INJECTION, SOLUTION INTRAVENOUS at 13:29

## 2024-08-05 RX ADMIN — ONDANSETRON 4 MG: 2 INJECTION INTRAMUSCULAR; INTRAVENOUS at 11:11

## 2024-08-05 RX ADMIN — MORPHINE SULFATE 2 MG: 2 INJECTION, SOLUTION INTRAMUSCULAR; INTRAVENOUS at 12:58

## 2024-08-05 RX ADMIN — CIPROFLOXACIN 400 MG: 400 INJECTION, SOLUTION INTRAVENOUS at 17:27

## 2024-08-05 RX ADMIN — METRONIDAZOLE 500 MG: 500 TABLET ORAL at 21:44

## 2024-08-05 NOTE — H&P
Counts include 234 beds at the [facility]  H&P  Name: Neisha Bowles 24 y.o. female I MRN: [id number]  Unit/Bed#: -01 I Date of Admission: 8/5/2024   Date of Service: 8/5/2024 I Hospital Day: 0      Assessment & Plan   Lower abdominal pain  Assessment & Plan  Plan as rectal bleeding    Rectal bleeding  Assessment & Plan  Patient reported she is starting having 2 episodes of bleeding per rectum since yesterday associated with lower abdominal pain.  Patient was recently diagnosed with Salmonella 2 to 3 weeks ago.  Imaging study showed mild descending infection/inflammation colitis  Will follow C. difficile, stool enteric pathogen  Will start empiric antibiotic with ciprofloxacin and Flagyl  Gentle IV fluids  N.p.o. after midnight  Clear liquids for now    Hx of Salmonella infection  Assessment & Plan  Plan for colonoscopy tomorrow  Consulted GI  Might need infectious disease           VTE Pharmacologic Prophylaxis: VTE Score: 0 Low Risk (Score 0-2) - Encourage Ambulation.  Code Status: Level 1 - Full Code   Discussion with family: Updated  (mother) at bedside.    Anticipated Length of Stay: Patient will be admitted on an observation basis with an anticipated length of stay of less than 2 midnights secondary to workup for [place] bleed.    Total Time Spent on Date of Encounter in care of patient: 87 mins. This time was spent on one or more of the following: performing physical exam; counseling and coordination of care; obtaining or reviewing history; documenting in the medical record; reviewing/ordering tests, medications or procedures; communicating with other healthcare professionals and discussing with patient's family/caregivers.    Chief Complaint: Low abdominal pain with bloody diarrhea since yesterday    History of Present Illness:  Neisha Bowles is a 24 y.o. female with a PMH of Salmonella colitis who presents with lower abdominal pain and bloody diarrhea since yesterday.  Patient reported since she  discharged from the hospital her appetite has not been the same as it used to be, feeling fatigued at times.  Patient reported starting having lower abdominal pain yesterday associated with 2 episodes of bloody diarrhea.    She denied any recent intake of over-the-counter medication.  No intake of aspirin and any other blood thinner.        Denied complaint of fever associated rigors and chills, nausea, vomiting, diarrhea      Labs reviewed personally with CT abdomen pelvis showed mild mural thickening suspected to have underdistention due to constipation/infectious/inflammatory colitis    Review of Systems:  Review of Systems all are negative examination above    Past Medical and Surgical History:   Past Medical History:   Diagnosis Date    Deaf, bilateral        Past Surgical History:   Procedure Laterality Date    COCHLEAR IMPLANT Right     FL LAPS FULG/EXC OVARY VISCERA/PERITONEAL SURFACE Left 3/18/2022    Procedure: LAPAROSCOPIC REMOVAL OF LEFT OVARIAN CYST;  Surgeon: Zainab Thomson MD;  Location: [facility];  Service: Gynecology    REDUCTION MAMMAPLASTY         Meds/Allergies:  Prior to Admission medications    Not on File     I have reviewed home medications with patient personally.    Allergies:   Allergies   Allergen Reactions    Amoxicillin Fever    Pollen Extract Nasal Congestion       Social History:  Marital Status: Single   Patient Pre-hospital Living Situation: Home  Patient Pre-hospital Level of Mobility: walks  Patient Pre-hospital Diet Restrictions: none  Substance Use History:   Social History     Substance and Sexual Activity   Alcohol Use Yes    Comment: wine-socially     Social History     Tobacco Use   Smoking Status Never   Smokeless Tobacco Never     Social History     Substance and Sexual Activity   Drug Use Never       Family History:  Family History   Problem Relation Age of Onset    Breast cancer Neg Hx     Ovarian cancer Neg Hx     Uterine cancer Neg Hx     Cervical cancer Neg Hx         Physical Exam:     Vitals:   Blood Pressure: 117/55 (08/05/24 1024)  Pulse: 69 (08/05/24 1024)  Temperature: 97.8 °F (36.6 °C) (08/05/24 1024)  Temp Source: Temporal (08/05/24 1024)  Respirations: 16 (08/05/24 1024)  SpO2: 99 % (08/05/24 1024)    Constitutional: no Acute distress  HEENT: Pallor or icterus  CVS: S1 plus S2  Respiratory: Normal vascular breathe without crackles and wheeze  Gastroenterology: Mild tenderness on deep palpation of lower abdomen  Skin: No bruises or ecchymosis  Neurology: No focal logical deficit       Additional Data:     Lab Results:  Results from last 7 days   Lab Units 08/05/24  1106   WBC Thousand/uL 5.19   HEMOGLOBIN g/dL 12.7   HEMATOCRIT % 38.8   PLATELETS Thousands/uL 249   SEGS PCT % 67   LYMPHO PCT % 19   MONO PCT % 12   EOS PCT % 2     Results from last 7 days   Lab Units 08/05/24  1106   SODIUM mmol/L 139   POTASSIUM mmol/L 3.5   CHLORIDE mmol/L 106   CO2 mmol/L 26   BUN mg/dL 6   CREATININE mg/dL 0.64   ANION GAP mmol/L 7   CALCIUM mg/dL 9.2   ALBUMIN g/dL 4.0   TOTAL BILIRUBIN mg/dL 0.60   ALK PHOS U/L 51   ALT U/L 11   AST U/L 10*   GLUCOSE RANDOM mg/dL 86                       Lines/Drains:  Invasive Devices       Peripheral Intravenous Line  Duration             Peripheral IV 08/05/24 Proximal;Right;Ventral (anterior) Forearm <1 day                        Imaging: Personally reviewed the following imaging: abdominal/pelvic CT  CT abdomen pelvis with contrast   Final Result by Will Mead MD (08/05 1441)      Mild mural thickening of the descending colon, which may be related to underdistention or reflect a mild infectious/inflammatory colitis.               Workstation performed: [id number]             EKG and Other Studies Reviewed on Admission:   EKG: No EKG obtained.    ** Please Note: This note has been constructed using a voice recognition system. **

## 2024-08-05 NOTE — LETTER
Critical access hospital 3RD FLOOR MED SURG UNIT  [address] [facility]  [place] [address]  Dept: [phone]    August 7, 2024     Patient: Neisha Bowles   YOB: 2000   Date of Visit: 8/5/2024       To Whom it May Concern:    Neisha Bowles is under my professional care. She was seen in the hospital from 8/5/2024 to 08/07/24. She may return to work on or after 8/12/2024    If you have any questions or concerns, please don't hesitate to call.         Sincerely,          Stephane Mejía MD

## 2024-08-05 NOTE — PLAN OF CARE
Problem: PAIN - ADULT  Goal: Verbalizes/displays adequate comfort level or baseline comfort level  Description: Interventions:  - Encourage patient to monitor pain and request assistance  - Assess pain using appropriate pain scale  - Administer analgesics based on type and severity of pain and evaluate response  - Implement non-pharmacological measures as appropriate and evaluate response  - Consider cultural and social influences on pain and pain management  - Notify physician/advanced practitioner if interventions unsuccessful or patient reports new pain  Outcome: Progressing     Problem: INFECTION - ADULT  Goal: Absence or prevention of progression during hospitalization  Description: INTERVENTIONS:  - Assess and monitor for signs and symptoms of infection  - Monitor lab/diagnostic results  - Monitor all insertion sites, i.e. indwelling lines, tubes, and drains  - Monitor endotracheal if appropriate and nasal secretions for changes in amount and color  - [place] appropriate cooling/warming therapies per order  - Administer medications as ordered  - Instruct and encourage patient and family to use good hand hygiene technique  - Identify and instruct in appropriate isolation precautions for identified infection/condition  Outcome: Progressing

## 2024-08-05 NOTE — ED PROVIDER NOTES
History  Chief Complaint   Patient presents with    Abdominal Pain    Diarrhea     Seen here 2-3 weeks ago for same, diagnosed with colitis. Seen at PCP earlier today and instructed to come to ED for eval     This is a 24-year-old female patient who is discharged from this hospital on 7/17/2024 at that time she was admitted for transmural colitis was found to have Salmonella.  Was seen by GI in the hospital and was told there was no need to follow-up because Salmonella will resolve on its own.  She is straining quite well and started going back to the gym and as of yesterday had severe left lower quadrant pain and started with a little bit of blood with bowel movement and now has straight blood.  She also had chills starting last night and nausea no vomiting.  No headache blurred vision double vision no congestion or sore throat no chest pain or shortness of breath.  No vomiting.  Diarrhea and opal blood no urgency frequency dysuria.  No vaginal discharge.  Was seen by PCP and was sent here for further evaluation.  Differential diagnose includes not limited to ongoing colitis, C. difficile, infectious diarrhea, Crohn's, ulcerative colitis        None       Past Medical History:   Diagnosis Date    Deaf, bilateral        Past Surgical History:   Procedure Laterality Date    COCHLEAR IMPLANT Right     ID LAPS FULG/EXC OVARY VISCERA/PERITONEAL SURFACE Left 3/18/2022    Procedure: LAPAROSCOPIC REMOVAL OF LEFT OVARIAN CYST;  Surgeon: Zainab Thomson MD;  Location: [facility];  Service: Gynecology    REDUCTION MAMMAPLASTY         Family History   Problem Relation Age of Onset    Breast cancer Neg Hx     Ovarian cancer Neg Hx     Uterine cancer Neg Hx     Cervical cancer Neg Hx      I have reviewed and agree with the history as documented.    E-Cigarette/Vaping    E-Cigarette Use Never User      E-Cigarette/Vaping Substances    Nicotine No     THC No     CBD No     Flavoring No     Other No     Unknown No       Social History     Tobacco Use    Smoking status: Never    Smokeless tobacco: Never   Vaping Use    Vaping status: Never Used   Substance Use Topics    Alcohol use: Yes     Comment: wine-socially    Drug use: Never       Review of Systems   Constitutional:  Negative for diaphoresis, fatigue and fever.   HENT:  Negative for congestion, ear pain, nosebleeds and sore throat.    Eyes:  Negative for photophobia, pain, discharge and visual disturbance.   Respiratory:  Negative for cough, choking, chest tightness, shortness of breath and wheezing.    Cardiovascular:  Negative for chest pain and palpitations.   Gastrointestinal:  Positive for abdominal pain, blood in stool, diarrhea and nausea. Negative for abdominal distention, anal bleeding, constipation, rectal pain and vomiting.   Genitourinary:  Negative for dysuria, flank pain and frequency.   Musculoskeletal:  Negative for back pain, gait problem and joint swelling.   Skin:  Negative for color change and rash.   Neurological:  Negative for dizziness, syncope and headaches.   Psychiatric/Behavioral:  Negative for behavioral problems and confusion. The patient is not nervous/anxious.    All other systems reviewed and are negative.      Physical Exam  Physical Exam  Vitals and nursing note reviewed.   Constitutional:       General: She is not in acute distress.     Appearance: Normal appearance. She is well-developed. She is not ill-appearing, toxic-appearing or diaphoretic.   HENT:      Head: Normocephalic and atraumatic.      Right Ear: External ear normal.      Left Ear: External ear normal.      Nose: Nose normal. No congestion or rhinorrhea.      Mouth/Throat:      Mouth: Mucous membranes are moist.      Pharynx: Oropharynx is clear. No oropharyngeal exudate or posterior oropharyngeal erythema.   Eyes:      Extraocular Movements: Extraocular movements intact.      Conjunctiva/sclera: Conjunctivae normal.      Pupils: Pupils are equal, round, and reactive to  light.   Cardiovascular:      Rate and Rhythm: Normal rate and regular rhythm.   Pulmonary:      Effort: Pulmonary effort is normal. No respiratory distress.      Breath sounds: Normal breath sounds.   Abdominal:      General: Bowel sounds are normal.      Palpations: Abdomen is soft.      Tenderness: There is abdominal tenderness in the left lower quadrant. There is guarding. There is no rebound.   Genitourinary:     Rectum: Guaiac result positive.   Musculoskeletal:         General: Normal range of motion.      Cervical back: Normal range of motion and neck supple. No rigidity or tenderness.      Right lower leg: No edema.      Left lower leg: No edema.   Lymphadenopathy:      Cervical: No cervical adenopathy.   Skin:     General: Skin is warm and dry.      Capillary Refill: Capillary refill takes less than 2 seconds.      Findings: No rash.   Neurological:      General: No focal deficit present.      Mental Status: She is alert and oriented to person, place, and time. Mental status is at baseline.   Psychiatric:         Mood and Affect: Mood normal.         Behavior: Behavior normal.         Vital Signs  ED Triage Vitals   Temperature Pulse Respirations Blood Pressure SpO2   08/05/24 1024 08/05/24 1024 08/05/24 1024 08/05/24 1024 08/05/24 1024   97.8 °F (36.6 °C) 69 16 117/55 99 %      Temp Source Heart Rate Source Patient Position - Orthostatic VS BP Location FiO2 (%)   08/05/24 1024 08/05/24 1024 08/05/24 1024 08/05/24 1024 --   Temporal Monitor Sitting Left arm       Pain Score       08/05/24 1111       Med Not Given for Pain - for MAR use only           Vitals:    08/05/24 1024 08/05/24 1605   BP: 117/55 105/63   Pulse: 69 67   Patient Position - Orthostatic VS: Sitting          Visual Acuity      ED Medications  Medications   multi-electrolyte (PLASMALYTE-A/ISOLYTE-S PH 7.4) IV solution (has no administration in time range)   ciprofloxacin (CIPRO) IVPB (premix in 5% dextrose) 400 mg 200 mL (has no  administration in time range)   metroNIDAZOLE (FLAGYL) tablet 500 mg (500 mg Oral Given 8/5/24 1620)   sodium chloride 0.9 % bolus 1,000 mL (0 mL Intravenous Stopped 8/5/24 1256)   ondansetron (ZOFRAN) injection 4 mg (4 mg Intravenous Given 8/5/24 1111)   ketorolac (TORADOL) injection 15 mg (15 mg Intravenous Given 8/5/24 1111)   morphine injection 2 mg (2 mg Intravenous Given 8/5/24 1258)   iohexol (OMNIPAQUE) 350 MG/ML injection (MULTI-DOSE) 100 mL (100 mL Intravenous Given 8/5/24 1329)       Diagnostic Studies  Results Reviewed       Procedure Component Value Units Date/Time    UA w Reflex to Microscopic w Reflex to Culture [[id number]] Collected: 08/05/24 1257    Lab Status: Final result Specimen: Urine, Clean Catch Updated: 08/05/24 1308     Color, UA Light Yellow     Clarity, UA Turbid     Specific Gravity, UA 1.009     pH, UA 6.0     Leukocytes, UA Negative     Nitrite, UA Negative     Protein, UA Negative mg/dl      Glucose, UA Negative mg/dl      Ketones, UA Negative mg/dl      Urobilinogen, UA <2.0 mg/dl      Bilirubin, UA Negative     Occult Blood, UA Negative    POCT pregnancy, urine [[id number]]  (Normal) Resulted: 08/05/24 1256    Lab Status: Final result Updated: 08/05/24 1256     EXT Preg Test, Ur Negative     Control Valid    Comprehensive metabolic panel [[id number]]  (Abnormal) Collected: 08/05/24 1106    Lab Status: Final result Specimen: Blood from Arm, Right Updated: 08/05/24 1135     Sodium 139 mmol/L      Potassium 3.5 mmol/L      Chloride 106 mmol/L      CO2 26 mmol/L      ANION GAP 7 mmol/L      BUN 6 mg/dL      Creatinine 0.64 mg/dL      Glucose 86 mg/dL      Calcium 9.2 mg/dL      AST 10 U/L      ALT 11 U/L      Alkaline Phosphatase 51 U/L      Total Protein 6.5 g/dL      Albumin 4.0 g/dL      Total Bilirubin 0.60 mg/dL      eGFR 125 ml/min/1.73sq m     Narrative:      National Kidney Disease Foundation guidelines for Chronic Kidney Disease (CKD):     Stage 1 with normal or high GFR (GFR >  90 mL/min/1.73 square meters)    Stage 2 Mild CKD (GFR = 60-89 mL/min/1.73 square meters)    Stage 3A Moderate CKD (GFR = 45-59 mL/min/1.73 square meters)    Stage 3B Moderate CKD (GFR = 30-44 mL/min/1.73 square meters)    Stage 4 Severe CKD (GFR = 15-29 mL/min/1.73 square meters)    Stage 5 End Stage CKD (GFR <15 mL/min/1.73 square meters)  Note: GFR calculation is accurate only with a steady state creatinine    CBC and differential [[id number]] Collected: 08/05/24 1106    Lab Status: Final result Specimen: Blood from Arm, Right Updated: 08/05/24 1118     WBC 5.19 Thousand/uL      RBC 4.39 Million/uL      Hemoglobin 12.7 g/dL      Hematocrit 38.8 %      MCV 88 fL      MCH 28.9 pg      MCHC 32.7 g/dL      RDW 12.2 %      MPV 9.4 fL      Platelets 249 Thousands/uL      nRBC 0 /100 WBCs      Segmented % 67 %      Immature Grans % 0 %      Lymphocytes % 19 %      Monocytes % 12 %      Eosinophils Relative 2 %      Basophils Relative 0 %      Absolute Neutrophils 3.43 Thousands/µL      Absolute Immature Grans 0.02 Thousand/uL      Absolute Lymphocytes 1.00 Thousands/µL      Absolute Monocytes 0.61 Thousand/µL      Eosinophils Absolute 0.12 Thousand/µL      Basophils Absolute 0.01 Thousands/µL     Clostridium difficile toxin by PCR with EIA [[id number]]     Lab Status: No result Specimen: Stool from Per Rectum     Stool Enteric Bacterial Panel by PCR [[id number]]     Lab Status: No result Specimen: Stool from Rectum                    CT abdomen pelvis with contrast   Final Result by Will Mead MD (08/05 1441)      Mild mural thickening of the descending colon, which may be related to underdistention or reflect a mild infectious/inflammatory colitis.               Workstation performed: [id number]                    Procedures  Procedures         ED Course                                 SBIRT 20yo+      Flowsheet Row Most Recent Value   Initial Alcohol Screen: US AUDIT-C     1. How often do you have a  drink containing alcohol? 0 Filed at: 08/05/2024 1102   2. How many drinks containing alcohol do you have on a typical day you are drinking?  0 Filed at: 08/05/2024 1102   3a. Male UNDER 65: How often do you have five or more drinks on one occasion? 0 Filed at: 08/05/2024 1102   3b. FEMALE Any Age, or MALE 65+: How often do you have 4 or more drinks on one occassion? 0 Filed at: 08/05/2024 1102   Audit-C Score 0 Filed at: 08/05/2024 1102   MAURICIO: How many times in the past year have you...    Used an illegal drug or used a prescription medication for non-medical reasons? Never Filed at: 08/05/2024 1102                      Medical Decision Making  This is a 24-year-old female patient who was discharged from the hospital on 7/17/2024.  Colitis.  She is positive Salmonella but did not require any treatment and was sent home she was feeling well and the symptoms stopped for about a week and started to really yesterday with left lower quadrant pain and bright red blood per rectum or diarrhea was mostly just blood.  She came in some discomfort.  Differential diagnose includes not limited to infectious colitis, C. difficile, history of colitis, diverticulitis    Problems Addressed:  Abdominal pain: acute illness or injury     Details: Given Toradol and morphine still some discomfort no appetite spoke with GI who felt patient should be admitted on a liquid diet and have colonoscopy tomorrow  Colitis: acute illness or injury     Details: Patient's colitis is mild when compared to previous but she is still having significant symptoms will be admitted for observation  Hematochezia: acute illness or injury     Details: Patient is heme positive but hemodynamically stable    Amount and/or Complexity of Data Reviewed  External Data Reviewed: labs and notes.     Details: Labs and notes reviewed for comparison  Labs: ordered. Decision-making details documented in ED Course.     Details: All labs reviewed nothing that requires  immediate intervention  Radiology: ordered. Decision-making details documented in ED Course.     Details: Reviewed the CAT scan results which showed some mild colitis  Discussion of management or test interpretation with external provider(s): In joint decision-making the patient after speaking via epic chat with medicine and GI.  Admitted observation on liquid diet colonoscopy for tomorrow    Risk  Prescription drug management.  Decision regarding hospitalization.                 Disposition  Final diagnoses:   Abdominal pain   Hematochezia   Colitis     Time reflects when diagnosis was documented in both MDM as applicable and the Disposition within this note       Time User Action Codes Description Comment    8/5/2024  3:14 PM Cale Lima [R10.9] Abdominal pain     8/5/2024  3:14 PM Cale Liam [K92.1] Hematochezia     8/5/2024  3:14 PM Cale Lima [K52.9] Colitis           ED Disposition       ED Disposition   Admit    Condition   Stable    Date/Time   Mon Aug 5, 2024 1514    Comment   Case was discussed with Dr. Berry and the patient's admission status was agreed to be Admission Status: observation status to the service of Dr. Berry .               Follow-up Information    None         There are no discharge medications for this patient.      No discharge procedures on file.    PDMP Review         Value Time User    PDMP Reviewed  Yes 3/18/2022  2:44 PM Zainab Thomson MD            ED Provider  Electronically Signed by             Cale Lima PA-C  08/05/24 0043

## 2024-08-05 NOTE — ASSESSMENT & PLAN NOTE
Patient reported she is starting having 2 episodes of bleeding per rectum since yesterday associated with lower abdominal pain.  Patient was recently diagnosed with Salmonella colitis  2 to 3 weeks ago.  Imaging study showed mild descending infection/inflammation colitis  C. difficile, enteric panel not been sent.      Currently she is afebrile, hemodynamic stable.  Patient does complain of having abdominal pain and diarrhea however denies having any further episodes of blood in her stool.  Hemoglobin has remained stable around 12 range.   N.p.o. after midnight  GI planning on colonoscopy tomorrow AM.

## 2024-08-05 NOTE — ASSESSMENT & PLAN NOTE
Patient reported she is starting having 2 episodes of bleeding per rectum since yesterday associated with lower abdominal pain.  Patient was recently diagnosed with Salmonella 2 to 3 weeks ago.  Imaging study showed mild descending infection/inflammation colitis  Will follow C. difficile, stool enteric pathogen  Will start empiric antibiotic with ciprofloxacin and Flagyl  Gentle IV fluids  N.p.o. after midnight  Clear liquids for now

## 2024-08-06 LAB
ALBUMIN SERPL BCG-MCNC: 3.5 G/DL (ref 3.5–5)
ALP SERPL-CCNC: 45 U/L (ref 34–104)
ALT SERPL W P-5'-P-CCNC: 10 U/L (ref 7–52)
ANION GAP SERPL CALCULATED.3IONS-SCNC: 5 MMOL/L (ref 4–13)
AST SERPL W P-5'-P-CCNC: 10 U/L (ref 13–39)
BILIRUB SERPL-MCNC: 0.46 MG/DL (ref 0.2–1)
BUN SERPL-MCNC: 5 MG/DL (ref 5–25)
CALCIUM SERPL-MCNC: 8.8 MG/DL (ref 8.4–10.2)
CHLORIDE SERPL-SCNC: 108 MMOL/L (ref 96–108)
CO2 SERPL-SCNC: 28 MMOL/L (ref 21–32)
CREAT SERPL-MCNC: 0.62 MG/DL (ref 0.6–1.3)
ERYTHROCYTE [DISTWIDTH] IN BLOOD BY AUTOMATED COUNT: 12.1 % (ref 11.6–15.1)
GFR SERPL CREATININE-BSD FRML MDRD: 126 ML/MIN/1.73SQ M
GLUCOSE P FAST SERPL-MCNC: 81 MG/DL (ref 65–99)
GLUCOSE SERPL-MCNC: 81 MG/DL (ref 65–140)
HCT VFR BLD AUTO: 36.3 % (ref 34.8–46.1)
HGB BLD-MCNC: 12.1 G/DL (ref 11.5–15.4)
MCH RBC QN AUTO: 29.4 PG (ref 26.8–34.3)
MCHC RBC AUTO-ENTMCNC: 33.3 G/DL (ref 31.4–37.4)
MCV RBC AUTO: 88 FL (ref 82–98)
PLATELET # BLD AUTO: 204 THOUSANDS/UL (ref 149–390)
PMV BLD AUTO: 9.5 FL (ref 8.9–12.7)
POTASSIUM SERPL-SCNC: 3.7 MMOL/L (ref 3.5–5.3)
PROT SERPL-MCNC: 5.8 G/DL (ref 6.4–8.4)
RBC # BLD AUTO: 4.12 MILLION/UL (ref 3.81–5.12)
SODIUM SERPL-SCNC: 141 MMOL/L (ref 135–147)
WBC # BLD AUTO: 3.77 THOUSAND/UL (ref 4.31–10.16)

## 2024-08-06 PROCEDURE — 80053 COMPREHEN METABOLIC PANEL: CPT | Performed by: STUDENT IN AN ORGANIZED HEALTH CARE EDUCATION/TRAINING PROGRAM

## 2024-08-06 PROCEDURE — 87186 SC STD MICRODIL/AGAR DIL: CPT | Performed by: PHYSICIAN ASSISTANT

## 2024-08-06 PROCEDURE — 87077 CULTURE AEROBIC IDENTIFY: CPT | Performed by: PHYSICIAN ASSISTANT

## 2024-08-06 PROCEDURE — 99222 1ST HOSP IP/OBS MODERATE 55: CPT | Performed by: INTERNAL MEDICINE

## 2024-08-06 PROCEDURE — 87505 NFCT AGENT DETECTION GI: CPT | Performed by: PHYSICIAN ASSISTANT

## 2024-08-06 PROCEDURE — 99232 SBSQ HOSP IP/OBS MODERATE 35: CPT | Performed by: STUDENT IN AN ORGANIZED HEALTH CARE EDUCATION/TRAINING PROGRAM

## 2024-08-06 PROCEDURE — 85027 COMPLETE CBC AUTOMATED: CPT | Performed by: STUDENT IN AN ORGANIZED HEALTH CARE EDUCATION/TRAINING PROGRAM

## 2024-08-06 RX ORDER — POLYETHYLENE GLYCOL 3350 17 G/17G
238 POWDER, FOR SOLUTION ORAL ONCE
Status: COMPLETED | OUTPATIENT
Start: 2024-08-06 | End: 2024-08-06

## 2024-08-06 RX ADMIN — CIPROFLOXACIN 400 MG: 400 INJECTION, SOLUTION INTRAVENOUS at 05:37

## 2024-08-06 RX ADMIN — POLYETHYLENE GLYCOL 3350 238 G: 17 POWDER, FOR SOLUTION ORAL at 15:44

## 2024-08-06 RX ADMIN — METRONIDAZOLE 500 MG: 500 TABLET ORAL at 05:36

## 2024-08-06 NOTE — PLAN OF CARE
Problem: INFECTION - ADULT  Goal: Absence or prevention of progression during hospitalization  Description: INTERVENTIONS:  - Assess and monitor for signs and symptoms of infection  - Monitor lab/diagnostic results  - Monitor all insertion sites, i.e. indwelling lines, tubes, and drains  - Monitor endotracheal if appropriate and nasal secretions for changes in amount and color  - [place] appropriate cooling/warming therapies per order  - Administer medications as ordered  - Instruct and encourage patient and family to use good hand hygiene technique  - Identify and instruct in appropriate isolation precautions for identified infection/condition  Outcome: Progressing

## 2024-08-06 NOTE — CONSULTS
Consultation -  [facility]  Neisha Bowles 24 y.o. female MRN: [id number]  Unit/Bed#: -01 Encounter: [id number]        Inpatient consult to gastroenterology  Consult performed by: Anuja Burroughs PA-C  Consult ordered by: Felton Berry MD          Reason for Consult / Principal Problem: Diarrhea, rectal bleeding     HPI: 24-year-old female with no significant past medical history who presented to [facility] yesterday with complaints of diarrhea, abdominal pain or rectal bleeding.  The patient was recently hospitalized 2 weeks ago with similar symptoms at which time she tested positive for Salmonella.  Thankfully, her symptoms improved with conservative treatment.  No antibiotics were given as these can acutely worsen the symptoms of this infection.  She reports that over the past 2 weeks her stools are still soft but became more firm and she was only having 1-2 stools a day.  She reports that on Sunday she became acutely ill again with increased diarrhea, and abdominal pain.  She reports that Sunday into Monday her symptoms worsened and her stools became bloody.  She reports that her urination decreased and so she came back to the emergency room.  CT was repeated showing mild thickening of the descending colon.  Labs showed a normal white blood cell count she has had no fevers.    REVIEW OF SYSTEMS:    CONSTITUTIONAL: Denies any fever, chills, or rigors. Good appetite, and no recent weight loss.  HEENT: No earache or tinnitus. Denies hearing loss or visual disturbances.  CARDIOVASCULAR: No chest pain or palpitations.   RESPIRATORY: Denies any cough, hemoptysis, shortness of breath or dyspnea on exertion.  GASTROINTESTINAL: As noted in the History of Present Illness.   GENITOURINARY: No problems with urination. Denies any hematuria or dysuria.  NEUROLOGIC: No dizziness or vertigo, denies headaches.   MUSCULOSKELETAL: Denies any muscle or joint pain.   SKIN: Denies skin rashes or  "itching.   ENDOCRINE: Denies excessive thirst. Denies intolerance to heat or cold.  PSYCHOSOCIAL: Denies depression or anxiety. Denies any recent memory loss.       Historical Information   Past Medical History:   Diagnosis Date    Deaf, bilateral      Past Surgical History:   Procedure Laterality Date    COCHLEAR IMPLANT Right     KY LAPS FULG/EXC OVARY VISCERA/PERITONEAL SURFACE Left 3/18/2022    Procedure: LAPAROSCOPIC REMOVAL OF LEFT OVARIAN CYST;  Surgeon: Zainab Thomson MD;  Location: [facility];  Service: Gynecology    REDUCTION MAMMAPLASTY       Social History   Social History     Substance and Sexual Activity   Alcohol Use Yes    Comment: wine-socially     Social History     Substance and Sexual Activity   Drug Use Never     Social History     Tobacco Use   Smoking Status Never   Smokeless Tobacco Never     Family History   Problem Relation Age of Onset    Breast cancer Neg Hx     Ovarian cancer Neg Hx     Uterine cancer Neg Hx     Cervical cancer Neg Hx        Meds/Allergies     No medications prior to admission.  Current Facility-Administered Medications   Medication Dose Route Frequency    ciprofloxacin (CIPRO) IVPB (premix in 5% dextrose) 400 mg 200 mL  400 mg Intravenous Q12H    metroNIDAZOLE (FLAGYL) tablet 500 mg  500 mg Oral Q8H EDGAR    polyethylene glycol (GLYCOLAX) bowel prep 238 g  238 g Oral Once       Allergies   Allergen Reactions    Amoxicillin Fever    Pollen Extract Nasal Congestion           Objective     Blood pressure 105/60, pulse 56, temperature 98.3 °F (36.8 °C), resp. rate 16, height 5' 5\" (1.651 m), weight 69 kg (152 lb 1.9 oz), SpO2 97%.      Intake/Output Summary (Last 24 hours) at 8/6/2024 1141  Last data filed at 8/6/2024 0850  Gross per 24 hour   Intake 0 ml   Output --   Net 0 ml         PHYSICAL EXAM:      General Appearance:   Alert, cooperative, no distress, appears stated age    HEENT:   Normocephalic, atraumatic, anicteric.     Neck:  Supple, symmetrical, trachea " midline, no adenopathy;    thyroid: no enlargement/tenderness/nodules; no carotid  bruit or JVD    Lungs:   Clear to auscultation bilaterally; no rales, rhonchi or wheezing; respirations unlabored    Heart::   S1 and S2 normal; regular rate and rhythm; no murmur, rub, or gallop.   Abdomen:   Soft, tender, non-distended; normal bowel sounds; no masses, no organomegaly    Genitalia:   Deferred    Rectal:   Deferred    Extremities:  No cyanosis, clubbing or edema    Pulses:  2+ and symmetric all extremities    Skin:  Skin color, texture, turgor normal, no rashes or lesions    Lymph nodes:  No palpable cervical, axillary or inguinal lymphadenopathy        Lab Results:   Results from last 7 days   Lab Units 08/06/24  0530 08/05/24  1106   WBC Thousand/uL 3.77* 5.19   HEMOGLOBIN g/dL 12.1 12.7   HEMATOCRIT % 36.3 38.8   PLATELETS Thousands/uL 204 249   SEGS PCT %  --  67   LYMPHO PCT %  --  19   MONO PCT %  --  12   EOS PCT %  --  2     Results from last 7 days   Lab Units 08/06/24  0530   POTASSIUM mmol/L 3.7   CHLORIDE mmol/L 108   CO2 mmol/L 28   BUN mg/dL 5   CREATININE mg/dL 0.62   CALCIUM mg/dL 8.8   ALK PHOS U/L 45   ALT U/L 10   AST U/L 10*               Imaging Studies: I have personally reviewed pertinent imaging studies.    CT abdomen pelvis with contrast    Result Date: 8/5/2024  Impression: Mild mural thickening of the descending colon, which may be related to underdistention or reflect a mild infectious/inflammatory colitis. Workstation performed: [id number]       ASSESSMENT and PLAN:      Diarrhea  Recent history of Salmonella infection  - Patient presented to [facility] yesterday with c/o worsening diarrhea, abdominal pain and rectal bleeding  - She was hospitalized 2 weeks ago with similar symptoms and tested positive for Salmonella - she did not receive antibiotics as these can frequently worsen the symptoms  - Symptoms improved with conservative management although she reports ongoing loose stools until last  Friday  - Suspect ongoing recovery from recent infectious colitis  - Patient is requesting a colonoscopy.  Will plan for tomorrow  - Follow up repeat stool cultures  - No indication for antibiotics at present  - IVF, pain control, antiemetics prn  - Trend labs        Patient was seen and examined by Dr Royal. All rod medical decisions were made by Dr Royal. Thank you for allowing us to participate in the care of this present patient. We will follow-up with you closely.

## 2024-08-06 NOTE — UTILIZATION REVIEW
Initial Clinical Review    WAS OBSERVATION 8/5/24 @ 1515 CONVERTED TO INPATIENT ADMISSION 8/6/24 @ 1059 DUE TO CONTINUED STAY REQUIRED TO CARE FOR PATIENT WITH RECTAL BLEEDING.    Admission Orders (From admission, onward)       Ordered        08/06/24 1059  INPATIENT ADMISSION  Once            08/05/24 1515  Place in Observation  Once                           Orders Placed This Encounter   Procedures    INPATIENT ADMISSION     Standing Status:   Standing     Number of Occurrences:   1     Order Specific Question:   Level of Care     Answer:   Med Surg [16]     Order Specific Question:   Estimated length of stay     Answer:   More than 2 Midnights     Order Specific Question:   Certification     Answer:   I certify that inpatient services are medically necessary for this patient for a duration of greater than two midnights. See H&P and MD Progress Notes for additional information about the patient's course of treatment.          ED Arrival Information       Expected   -    Arrival   8/5/2024 10:15    Acuity   Urgent              Means of arrival   Walk-In    Escorted by   Family Member    Service   Hospitalist    Admission type   Emergency              Arrival complaint   ABD PAIN             Chief Complaint   Patient presents with    Abdominal Pain    Diarrhea     Seen here 2-3 weeks ago for same, diagnosed with colitis. Seen at PCP earlier today and instructed to come to ED for eval       Initial Presentation: 24 y.o. female to ED via walk-in from home  Present to ED with lower abdominal pain and bloody diarrhea since yesterday.  Patient reported since she discharged from the hospital her appetite has not been the same as it used to be, feeling fatigued at times.  Patient reported starting having lower abdominal pain yesterday associated with 2 episodes of bloody diarrhea.   PMHX: Salmonella colitis (recently diagnosed 2 to 3 weeks ago)  Admitted to OBS with DX: Rectal bleeding   on exam: Mild tenderness on deep  palpation of lower abdomen; pain 8/10  Imaging study showed mild descending infection/inflammation colitis   PLAN: cont iv abx; f/u C. Diff panel; cont ivf; clear liq diet; monitor labs; pain / nausea control (see below); GI consult       Anticipated Length of Stay/Certification Statement: Patient will be admitted on an observation basis with an anticipated length of stay of less than 2 midnights secondary to workup for ME bleed.       Date: 8/6/24 - CHANGED TO INPATIENT   Patient does complain of having abdominal pain and diarrhea however denies having any further episodes of blood in her stool. Pain 4-8/10. Hemoglobin has remained stable around 12 range.   Plan:cont iv abx; f/u C. Diff panel; cont ivf; clear liq diet; monitor labs; pain / nausea control (see below); Per Gi colonoscopy tomorrow      GI CONSULT   Assessment: Diarrhea / Rectal bleeding / Salmonella colitis  Patient is a 24-year-old female with recent admission for Salmonella colitis who presents tomorrow yesterday with diarrhea abdominal pain and rectal bleeding.  She is hospitalized 2 weeks ago with similar symptoms diagnosed with Salmonella and was treated with conservative management without antibiotics and improved.  She is currently having 1-2 stools per day but reports 2 days ago she became acutely ill with increased diarrhea and abdominal pain mainly in the left lower quadrant and then noticed her stool became bloody.  CAT scan of the abdomen which I reviewed personally shows mild thickening of the descending colon.  Labs showed normal WBC  Plan: Plan for colonoscopy tomorrow.  Continue antibiotics.  Suspect her symptoms are from her Salmonella colitis versus postinfectious IBS.  IV fluids.  Pain control.  Antiemetics.      Date: 8/7/24   Day 2 - INPATIENT   Discharged to  home          ED Triage Vitals   Temperature Pulse Respirations Blood Pressure SpO2 Pain Score   08/05/24 1024 08/05/24 1024 08/05/24 1024 08/05/24 1024 08/05/24 1024  08/05/24 1111   97.8 °F (36.6 °C) 69 16 117/55 99 % Med Not Given for Pain - for MAR use only     Weight (last 2 days) before discharge       Date/Time Weight    08/05/24 1806 69 (152.12)    08/05/24 1606 69 (152.12)            Vital Signs (last 3 days) before discharge       Date/Time Temp Pulse Resp BP MAP (mmHg) SpO2 O2 Device Patient Position - Orthostatic VS Pain    08/07/24 1135 -- 60 17 110/66 -- 97 % None (Room air) -- No Pain    08/07/24 1125 -- 51 19 95/54 -- 96 % None (Room air) -- No Pain    08/07/24 1115 97.9 °F (36.6 °C) 51 16 95/51 -- 97 % None (Room air) -- --    08/07/24 1009 97.9 °F (36.6 °C) 66 20 110/67 -- 100 % None (Room air) -- 7    08/07/24 0830 -- -- -- -- -- 100 % None (Room air) -- No Pain    08/07/24 08:03:46 98 °F (36.7 °C) -- -- 103/56 72 -- -- -- --    08/06/24 22:16:24 98.4 °F (36.9 °C) 55 -- 107/66 80 98 % -- -- --    08/06/24 1948 -- -- -- -- -- -- -- -- No Pain    08/06/24 14:26:48 98.5 °F (36.9 °C) -- -- 104/69 81 -- -- -- --    08/06/24 14:24:24 98.5 °F (36.9 °C) -- -- 104/69 81 -- -- -- --    08/06/24 14:24:02 98.5 °F (36.9 °C) -- -- 104/69 81 -- -- -- --    08/06/24 1100 -- -- -- -- -- 97 % None (Room air) -- No Pain    08/06/24 07:41:01 98.3 °F (36.8 °C) -- -- 105/60 75 -- -- -- --    08/05/24 2253 -- -- -- -- -- -- None (Room air) -- No Pain    08/05/24 21:59:35 98.6 °F (37 °C) 56 -- 105/61 76 96 % -- -- --    08/05/24 1806 98.9 °F (37.2 °C) -- 16 105/63 -- -- None (Room air) -- 4    08/05/24 16:05:44 97.9 °F (36.6 °C) 67 -- 105/63 77 98 % -- -- --    08/05/24 1258 -- -- -- -- -- -- -- -- 8    08/05/24 1111 -- -- -- -- -- -- -- -- Med Not Given for Pain - for MAR use only    08/05/24 1024 97.8 °F (36.6 °C) 69 16 117/55 -- 99 % None (Room air) Sitting --              Pertinent Labs/Diagnostic Test Results:   Radiology:  CT abdomen pelvis with contrast   Final Interpretation by Will Mead MD (08/05 [id number])      Mild mural thickening of the descending colon, which  may be related to underdistention or reflect a mild infectious/inflammatory colitis.               Workstation performed: [id number]           Cardiology:  ECG 12 lead   Final Result by Talisha Duncan MD (08/05 1805)   Normal sinus rhythm   Nonspecific T wave abnormality   No previous ECGs available   Confirmed by Talisha Duncan ([id number]) on 8/5/2024 6:05:51 PM          Results from last 7 days   Lab Units 08/07/24  0513 08/06/24 0530 08/05/24  1106   WBC Thousand/uL 3.61* 3.77* 5.19   HEMOGLOBIN g/dL 12.4 12.1 12.7   HEMATOCRIT % 38.6 36.3 38.8   PLATELETS Thousands/uL 211 204 249   TOTAL NEUT ABS Thousands/µL  --   --  3.43        Results from last 7 days   Lab Units 08/07/24  0513 08/06/24  0530 08/05/24  1106   SODIUM mmol/L 141 141 139   POTASSIUM mmol/L 4.1 3.7 3.5   CHLORIDE mmol/L 109* 108 106   CO2 mmol/L 26 28 26   ANION GAP mmol/L 6 5 7   BUN mg/dL 4* 5 6   CREATININE mg/dL 0.63 0.62 0.64   EGFR ml/min/1.73sq m 125 126 125   CALCIUM mg/dL 9.1 8.8 9.2   MAGNESIUM mg/dL 1.9  --   --      Results from last 7 days   Lab Units 08/06/24  0530 08/05/24  1106   AST U/L 10* 10*   ALT U/L 10 11   ALK PHOS U/L 45 51   TOTAL PROTEIN g/dL 5.8* 6.5   ALBUMIN g/dL 3.5 4.0   TOTAL BILIRUBIN mg/dL 0.46 0.60        Results from last 7 days   Lab Units 08/07/24  0513 08/06/24  0530 08/05/24  1106   GLUCOSE RANDOM mg/dL 82 81 86              Results from last 7 days   Lab Units 08/05/24  1257   CLARITY UA  Turbid   COLOR UA  Light Yellow   SPEC GRAV UA  1.009   PH UA  6.0   GLUCOSE UA mg/dl Negative   KETONES UA mg/dl Negative   BLOOD UA  Negative   PROTEIN UA mg/dl Negative   NITRITE UA  Negative   BILIRUBIN UA  Negative   UROBILINOGEN UA (BE) mg/dl <2.0   LEUKOCYTES UA  Negative          ED Treatment-Medication Administration from 08/05/2024 1015 to 08/05/2024 1548         Date/Time Order Dose Route Action     08/05/2024 1110 sodium chloride 0.9 % bolus 1,000 mL 1,000 mL Intravenous New Bag     08/05/2024 1111  ondansetron (ZOFRAN) injection 4 mg 4 mg Intravenous Given     08/05/2024 1111 ketorolac (TORADOL) injection 15 mg 15 mg Intravenous Given     08/05/2024 1258 morphine injection 2 mg 2 mg Intravenous Given     08/05/2024 1329 iohexol (OMNIPAQUE) 350 MG/ML injection (MULTI-DOSE) 100 mL 100 mL Intravenous Given            Past Medical History:   Diagnosis Date    Deaf, bilateral      Present on Admission:   Lower abdominal pain   Hx of Salmonella infection   Rectal bleeding      Admitting Diagnosis: Hematochezia [K92.1]  Colitis [K52.9]  Abdominal pain [R10.9]    Age/Sex: 24 y.o. female    Admission Orders: SCDs; I/O; NPO    Scheduled Medications:  ciprofloxacin, 400 mg, Intravenous, Q12H  metroNIDAZOLE, 500 mg, Oral, Q8H EDGAR      Continuous IV Infusions: multi-electrolyte (PLASMALYTE-A/ISOLYTE-S PH 7.4) IV solution Rate: 100 mL/hr        PRN Meds: none         IP CONSULT TO GASTROENTEROLOGY    Network Utilization Review Department  ATTENTION: Please call with any questions or concerns to [phone] and carefully listen to the prompts so that you are directed to the right person. All voicemails are confidential.   For Discharge needs, contact Care Management DC Support Team at [phone] opt. [phone]  Send all requests for admission clinical reviews, approved or denied determinations and any other requests to dedicated fax number below belonging to the campus where the patient is receiving treatment. List of dedicated fax numbers for the Facilities:  FACILITY NAME UR FAX NUMBER   ADMISSION DENIALS (Administrative/Medical Necessity) [phone]   DISCHARGE SUPPORT TEAM (NETWORK) [phone]   PARENT CHILD HEALTH (Maternity/NICU/Pediatrics) [phone]   [facility] [phone]   [facility] [phone]   [facility] [phone]   [facility] [phone]   [facility] [phone]   [facility]  General acute hospital [phone]   [facility] [phone]   [facility] [phone]   [facility] [phone]   [facility] [phone]   [facility] [phone]   [facility] [phone]

## 2024-08-06 NOTE — PLAN OF CARE
Problem: PAIN - ADULT  Goal: Verbalizes/displays adequate comfort level or baseline comfort level  Description: Interventions:  - Encourage patient to monitor pain and request assistance  - Assess pain using appropriate pain scale  - Administer analgesics based on type and severity of pain and evaluate response  - Implement non-pharmacological measures as appropriate and evaluate response  - Consider cultural and social influences on pain and pain management  - Notify physician/advanced practitioner if interventions unsuccessful or patient reports new pain  Outcome: Progressing     Problem: INFECTION - ADULT  Goal: Absence or prevention of progression during hospitalization  Description: INTERVENTIONS:  - Assess and monitor for signs and symptoms of infection  - Monitor lab/diagnostic results  - Monitor all insertion sites, i.e. indwelling lines, tubes, and drains  - Monitor endotracheal if appropriate and nasal secretions for changes in amount and color  - [place] appropriate cooling/warming therapies per order  - Administer medications as ordered  - Instruct and encourage patient and family to use good hand hygiene technique  - Identify and instruct in appropriate isolation precautions for identified infection/condition  Outcome: Progressing  Goal: Absence of fever/infection during neutropenic period  Description: INTERVENTIONS:  - Monitor WBC    Outcome: Progressing     Problem: SAFETY ADULT  Goal: Patient will remain free of falls  Description: INTERVENTIONS:  - Educate patient/family on patient safety including physical limitations  - Instruct patient to call for assistance with activity   - Consult OT/PT to assist with strengthening/mobility   - Keep Call bell within reach  - Keep bed low and locked with side rails adjusted as appropriate  - Keep care items and personal belongings within reach  - Initiate and maintain comfort rounds  - Make Fall Risk Sign visible to staff  - Offer Toileting every 2 Hours,  in advance of need  - Initiate/Maintain bed alarm  - Obtain necessary fall risk management equipment:   - Apply yellow socks and bracelet for high fall risk patients  - Consider moving patient to room near nurses station  Outcome: Progressing  Goal: Maintain or return to baseline ADL function  Description: INTERVENTIONS:  -  Assess patient's ability to carry out ADLs; assess patient's baseline for ADL function and identify physical deficits which impact ability to perform ADLs (bathing, care of mouth/teeth, toileting, grooming, dressing, etc.)  - Assess/evaluate cause of self-care deficits   - Assess range of motion  - Assess patient's mobility; develop plan if impaired  - Assess patient's need for assistive devices and provide as appropriate  - Encourage maximum independence but intervene and supervise when necessary  - Involve family in performance of ADLs  - Assess for home care needs following discharge   - Consider OT consult to assist with ADL evaluation and planning for discharge  - Provide patient education as appropriate  Outcome: Progressing  Goal: Maintains/Returns to pre admission functional level  Description: INTERVENTIONS:  - Perform AM-PAC 6 Click Basic Mobility/ Daily Activity assessment daily.  - Set and communicate daily mobility goal to care team and patient/family/caregiver.   - Collaborate with rehabilitation services on mobility goals if consulted  - Perform Range of Motion 3 times a day.  - Reposition patient every 2 hours.  - Dangle patient 3 times a day  - Stand patient 3 times a day  - Ambulate patient 3 times a day  - Out of bed to chair 3 times a day   - Out of bed for meals 3 times a day  - Out of bed for toileting  - Record patient progress and toleration of activity level   Outcome: Progressing     Problem: DISCHARGE PLANNING  Goal: Discharge to home or other facility with appropriate resources  Description: INTERVENTIONS:  - Identify barriers to discharge w/patient and caregiver  -  Arrange for needed discharge resources and transportation as appropriate  - Identify discharge learning needs (meds, wound care, etc.)  - Arrange for interpretive services to assist at discharge as needed  - Refer to Case Management Department for coordinating discharge planning if the patient needs post-hospital services based on physician/advanced practitioner order or complex needs related to functional status, cognitive ability, or social support system  Outcome: Progressing     Problem: Knowledge Deficit  Goal: Patient/family/caregiver demonstrates understanding of disease process, treatment plan, medications, and discharge instructions  Description: Complete learning assessment and assess knowledge base.  Interventions:  - Provide teaching at level of understanding  - Provide teaching via preferred learning methods  Outcome: Progressing

## 2024-08-06 NOTE — PROGRESS NOTES
[facility]  Progress Note  Name: Neisha Bowles I  MRN: [id number]  Unit/Bed#: -01 I Date of Admission: 8/5/2024   Date of Service: 8/6/2024 I Hospital Day: 0    Assessment & Plan   * Rectal bleeding  Assessment & Plan  Patient reported she is starting having 2 episodes of bleeding per rectum since yesterday associated with lower abdominal pain.  Patient was recently diagnosed with Salmonella colitis  2 to 3 weeks ago.  Imaging study showed mild descending infection/inflammation colitis  C. difficile, enteric panel not been sent.      Currently she is afebrile, hemodynamic stable.  Patient does complain of having abdominal pain and diarrhea however denies having any further episodes of blood in her stool.  Hemoglobin has remained stable around 12 range.   N.p.o. after midnight  GI planning on colonoscopy tomorrow AM.      Hx of Salmonella infection  Assessment & Plan  Plan for colonoscopy tomorrow  GI following.     Lower abdominal pain  Assessment & Plan  Plan as rectal bleeding               VTE Pharmacologic Prophylaxis: VTE Score: 0 Low Risk (Score 0-2) - Encourage Ambulation.    Mobility:   Basic Mobility Inpatient Raw Score: 24  -[facility] Goal: 8: Walk 250 feet or more  -HLM Achieved: 8: Walk 250 feet ot more      Patient Centered Rounds: I performed bedside rounds with nursing staff today.   Discussions with Specialists or Other Care Team Provider: Gi    Total Time Spent on Date of Encounter in care of patient: 25 mins. This time was spent on one or more of the following: performing physical exam; counseling and coordination of care; obtaining or reviewing history; documenting in the medical record; reviewing/ordering tests, medications or procedures; communicating with other healthcare professionals and discussing with patient's family/caregivers.    Current Length of Stay: 0 day(s)  Current Patient Status: Inpatient   Certification Statement: The patient will continue to  require additional inpatient hospital stay due to plan for colonoscopy tomorrow.  Discharge Plan: Anticipate discharge in 24-48 hrs to home.    Code Status: Level 1 - Full Code    Subjective:   Seen during a.m. rounds.  Patient complaining of having generalized abdominal pain with episodes of loose stools however denies any further episodes of bloody diarrhea.  Denies nausea, vomiting,  Fever, chills, chest pain, any other new complaints.  No other events reported.    Objective:     Vitals:   Temp (24hrs), Av.6 °F (37 °C), Min:98.3 °F (36.8 °C), Max:98.9 °F (37.2 °C)    Temp:  [98.3 °F (36.8 °C)-98.9 °F (37.2 °C)] 98.5 °F (36.9 °C)  HR:  [56] 56  Resp:  [16] 16  BP: (104-105)/(60-69) 104/69  SpO2:  [96 %-97 %] 97 %  Body mass index is 25.31 kg/m².     Input and Output Summary (last 24 hours):     Intake/Output Summary (Last 24 hours) at 2024 1643  Last data filed at 2024 1303  Gross per 24 hour   Intake 120 ml   Output --   Net 120 ml       Physical Exam:   Physical Exam  Constitutional:       General: She is not in acute distress.     Appearance: Normal appearance. She is not ill-appearing, toxic-appearing or diaphoretic.   HENT:      Head: Normocephalic and atraumatic.   Eyes:      Pupils: Pupils are equal, round, and reactive to light.   Cardiovascular:      Rate and Rhythm: Normal rate.      Pulses: Normal pulses.   Pulmonary:      Effort: Pulmonary effort is normal. No respiratory distress.      Breath sounds: Normal breath sounds. No wheezing.   Abdominal:      General: Bowel sounds are normal. There is no distension.      Palpations: Abdomen is soft.      Tenderness: There is no abdominal tenderness.   Neurological:      Mental Status: She is alert and oriented to person, place, and time.   Psychiatric:         Mood and Affect: Mood normal.         Behavior: Behavior normal.          Additional Data:     Labs:  Results from last 7 days   Lab Units 24  0530 24  1106   WBC Thousand/uL  3.77* 5.19   HEMOGLOBIN g/dL 12.1 12.7   HEMATOCRIT % 36.3 38.8   PLATELETS Thousands/uL 204 249   SEGS PCT %  --  67   LYMPHO PCT %  --  19   MONO PCT %  --  12   EOS PCT %  --  2     Results from last 7 days   Lab Units 08/06/24  0530   SODIUM mmol/L 141   POTASSIUM mmol/L 3.7   CHLORIDE mmol/L 108   CO2 mmol/L 28   BUN mg/dL 5   CREATININE mg/dL 0.62   ANION GAP mmol/L 5   CALCIUM mg/dL 8.8   ALBUMIN g/dL 3.5   TOTAL BILIRUBIN mg/dL 0.46   ALK PHOS U/L 45   ALT U/L 10   AST U/L 10*   GLUCOSE RANDOM mg/dL 81                       Lines/Drains:  Invasive Devices       Peripheral Intravenous Line  Duration             Peripheral IV 08/05/24 Proximal;Right;Ventral (anterior) Forearm 1 day                          Imaging: Reviewed radiology reports from this admission including: abdominal/pelvic CT    Recent Cultures (last 7 days):         Last 24 Hours Medication List:        Today, Patient Was Seen By: Stephane Mejía MD    **Please Note: This note may have been constructed using a voice recognition system.**

## 2024-08-07 ENCOUNTER — ANESTHESIA EVENT (INPATIENT)
Dept: GASTROENTEROLOGY | Facility: HOSPITAL | Age: 24
DRG: 372 | End: 2024-08-07
Payer: COMMERCIAL

## 2024-08-07 ENCOUNTER — APPOINTMENT (INPATIENT)
Dept: GASTROENTEROLOGY | Facility: HOSPITAL | Age: 24
DRG: 372 | End: 2024-08-07
Payer: COMMERCIAL

## 2024-08-07 ENCOUNTER — ANESTHESIA (INPATIENT)
Dept: GASTROENTEROLOGY | Facility: HOSPITAL | Age: 24
DRG: 372 | End: 2024-08-07
Payer: COMMERCIAL

## 2024-08-07 VITALS
OXYGEN SATURATION: 97 % | TEMPERATURE: 97.9 F | BODY MASS INDEX: 25.34 KG/M2 | HEIGHT: 65 IN | RESPIRATION RATE: 17 BRPM | WEIGHT: 152.12 LBS | DIASTOLIC BLOOD PRESSURE: 66 MMHG | HEART RATE: 60 BPM | SYSTOLIC BLOOD PRESSURE: 110 MMHG

## 2024-08-07 LAB
ANION GAP SERPL CALCULATED.3IONS-SCNC: 6 MMOL/L (ref 4–13)
BUN SERPL-MCNC: 4 MG/DL (ref 5–25)
C COLI+JEJUNI TUF STL QL NAA+PROBE: NEGATIVE
C DIFF TOX GENS STL QL NAA+PROBE: NEGATIVE
CALCIUM SERPL-MCNC: 9.1 MG/DL (ref 8.4–10.2)
CHLORIDE SERPL-SCNC: 109 MMOL/L (ref 96–108)
CO2 SERPL-SCNC: 26 MMOL/L (ref 21–32)
CREAT SERPL-MCNC: 0.63 MG/DL (ref 0.6–1.3)
EC STX1+STX2 GENES STL QL NAA+PROBE: NEGATIVE
ERYTHROCYTE [DISTWIDTH] IN BLOOD BY AUTOMATED COUNT: 12.4 % (ref 11.6–15.1)
GFR SERPL CREATININE-BSD FRML MDRD: 125 ML/MIN/1.73SQ M
GLUCOSE SERPL-MCNC: 82 MG/DL (ref 65–140)
HCT VFR BLD AUTO: 38.6 % (ref 34.8–46.1)
HGB BLD-MCNC: 12.4 G/DL (ref 11.5–15.4)
MAGNESIUM SERPL-MCNC: 1.9 MG/DL (ref 1.9–2.7)
MCH RBC QN AUTO: 28.3 PG (ref 26.8–34.3)
MCHC RBC AUTO-ENTMCNC: 32.1 G/DL (ref 31.4–37.4)
MCV RBC AUTO: 88 FL (ref 82–98)
PLATELET # BLD AUTO: 211 THOUSANDS/UL (ref 149–390)
PMV BLD AUTO: 9.2 FL (ref 8.9–12.7)
POTASSIUM SERPL-SCNC: 4.1 MMOL/L (ref 3.5–5.3)
RBC # BLD AUTO: 4.38 MILLION/UL (ref 3.81–5.12)
SALMONELLA SP SPAO STL QL NAA+PROBE: POSITIVE
SHIGELLA SP+EIEC IPAH STL QL NAA+PROBE: NEGATIVE
SODIUM SERPL-SCNC: 141 MMOL/L (ref 135–147)
WBC # BLD AUTO: 3.61 THOUSAND/UL (ref 4.31–10.16)

## 2024-08-07 PROCEDURE — 80048 BASIC METABOLIC PNL TOTAL CA: CPT | Performed by: STUDENT IN AN ORGANIZED HEALTH CARE EDUCATION/TRAINING PROGRAM

## 2024-08-07 PROCEDURE — 85027 COMPLETE CBC AUTOMATED: CPT | Performed by: STUDENT IN AN ORGANIZED HEALTH CARE EDUCATION/TRAINING PROGRAM

## 2024-08-07 PROCEDURE — 83735 ASSAY OF MAGNESIUM: CPT | Performed by: STUDENT IN AN ORGANIZED HEALTH CARE EDUCATION/TRAINING PROGRAM

## 2024-08-07 PROCEDURE — 99239 HOSP IP/OBS DSCHRG MGMT >30: CPT | Performed by: STUDENT IN AN ORGANIZED HEALTH CARE EDUCATION/TRAINING PROGRAM

## 2024-08-07 PROCEDURE — 45380 COLONOSCOPY AND BIOPSY: CPT | Performed by: INTERNAL MEDICINE

## 2024-08-07 PROCEDURE — 0DBB8ZX EXCISION OF ILEUM, VIA NATURAL OR ARTIFICIAL OPENING ENDOSCOPIC, DIAGNOSTIC: ICD-10-PCS | Performed by: INTERNAL MEDICINE

## 2024-08-07 PROCEDURE — 88305 TISSUE EXAM BY PATHOLOGIST: CPT | Performed by: PATHOLOGY

## 2024-08-07 RX ORDER — SACCHAROMYCES BOULARDII 250 MG
250 CAPSULE ORAL 2 TIMES DAILY
Qty: 10 CAPSULE | Refills: 0 | Status: SHIPPED | OUTPATIENT
Start: 2024-08-07

## 2024-08-07 RX ORDER — CIPROFLOXACIN 500 MG/1
500 TABLET, FILM COATED ORAL EVERY 12 HOURS SCHEDULED
Qty: 10 TABLET | Refills: 0 | Status: SHIPPED | OUTPATIENT
Start: 2024-08-07 | End: 2024-08-12

## 2024-08-07 RX ORDER — CIPROFLOXACIN 500 MG/1
500 TABLET, FILM COATED ORAL EVERY 12 HOURS SCHEDULED
Status: DISCONTINUED | OUTPATIENT
Start: 2024-08-07 | End: 2024-08-07 | Stop reason: HOSPADM

## 2024-08-07 RX ORDER — PROPOFOL 10 MG/ML
INJECTION, EMULSION INTRAVENOUS AS NEEDED
Status: DISCONTINUED | OUTPATIENT
Start: 2024-08-07 | End: 2024-08-07

## 2024-08-07 RX ORDER — LIDOCAINE HYDROCHLORIDE 20 MG/ML
INJECTION, SOLUTION EPIDURAL; INFILTRATION; INTRACAUDAL; PERINEURAL AS NEEDED
Status: DISCONTINUED | OUTPATIENT
Start: 2024-08-07 | End: 2024-08-07

## 2024-08-07 RX ORDER — SODIUM CHLORIDE, SODIUM LACTATE, POTASSIUM CHLORIDE, CALCIUM CHLORIDE 600; 310; 30; 20 MG/100ML; MG/100ML; MG/100ML; MG/100ML
INJECTION, SOLUTION INTRAVENOUS CONTINUOUS PRN
Status: DISCONTINUED | OUTPATIENT
Start: 2024-08-07 | End: 2024-08-07

## 2024-08-07 RX ADMIN — CIPROFLOXACIN 500 MG: 500 TABLET, FILM COATED ORAL at 12:28

## 2024-08-07 RX ADMIN — SODIUM CHLORIDE, SODIUM LACTATE, POTASSIUM CHLORIDE, AND CALCIUM CHLORIDE: .6; .31; .03; .02 INJECTION, SOLUTION INTRAVENOUS at 10:56

## 2024-08-07 RX ADMIN — PROPOFOL 100 MG: 10 INJECTION, EMULSION INTRAVENOUS at 11:04

## 2024-08-07 RX ADMIN — PROPOFOL 50 MG: 10 INJECTION, EMULSION INTRAVENOUS at 11:11

## 2024-08-07 RX ADMIN — LIDOCAINE HYDROCHLORIDE 50 MG: 20 INJECTION, SOLUTION EPIDURAL; INFILTRATION; INTRACAUDAL; PERINEURAL at 10:59

## 2024-08-07 RX ADMIN — PROPOFOL 150 MG: 10 INJECTION, EMULSION INTRAVENOUS at 10:59

## 2024-08-07 NOTE — ANESTHESIA PREPROCEDURE EVALUATION
"\"Patient is a 24-year-old female with recent admission for Salmonella colitis who presents tomorrow yesterday with diarrhea abdominal pain and rectal bleeding. She is hospitalized 2 weeks ago with similar symptoms diagnosed with Salmonella and was treated with conservative management without antibiotics and improved. She is currently having 1-2 stools per day but reports 2 days ago she became acutely ill with increased diarrhea and abdominal pain mainly in the left lower quadrant and then noticed her stool became bloody. CAT scan of the abdomen which I reviewed personally shows mild thickening of the descending colon. Labs showed normal WBC \"    Procedure:  COLONOSCOPY    Relevant Problems   GI/HEPATIC   (+) Rectal bleeding      MUSCULOSKELETAL   (+) Low back pain      NEURO/PSYCH   (+) Mild depression      Nervous and Auditory   (+) Congenital deafness      Other   (+) Cochlear implant in place        Physical Exam    Airway    Mallampati score: II  TM Distance: >3 FB  Neck ROM: full     Dental   No notable dental hx     Cardiovascular  Rhythm: regular, No weak pulses    Pulmonary   No stridor    Other Findings  post-pubertal.      Anesthesia Plan  ASA Score- 1     Anesthesia Type- IV sedation with anesthesia with ASA Monitors.         Additional Monitors:     Airway Plan:            Plan Factors-Exercise tolerance (METS): >4 METS.    Chart reviewed.   Existing labs reviewed. Patient summary reviewed.                  Induction- intravenous.    Postoperative Plan-     Perioperative Resuscitation Plan - Level 1 - Full Code.       Informed Consent- Anesthetic plan and risks discussed with patient.  I personally reviewed this patient with the CRNA. Discussed and agreed on the Anesthesia Plan with the CRNA..        "

## 2024-08-07 NOTE — DISCHARGE SUMMARY
[facility]  Discharge- Neisha Bowles 2000, 24 y.o. female MRN: [id number]  Unit/Bed#: MS [facility]-01 Encounter: [id number]  Primary Care Provider: No primary care provider on file.   Date and time admitted to hospital: 8/5/2024 10:37 AM    * Rectal bleeding  Assessment & Plan  Pt was in in ED on 7/16 and 7/17/2024 and was diagnosed with colitis secondary to Salmonella.  Presented to symptoms emergency room again on this hospitalization complaining of bloody bowel movement, abdominal pain, nausea.  C. difficile panel negative  Enteric panel positive for Salmonella PCR.  CT on pelvis reported with mild mural thickening of the descending colon which may be related to underdistention or mild infectious/inflammatory colitis.  Colonoscopy noted with mild edematous and erythematous mucosa with erosion in the descending colon, sigmoid colon, rectosigmoid and rectum, cold biopsies performed.      GI believes left-sided mild colitis likely due to resolving Salmonella infection.  Biopsy results pending at the time of the discharge.  GI recommended discharging on Cipro 500 mg daily for 5 days  Currently she is afebrile, hemodynamic stable.  Currently she is tolerating diet well.  Hemoglobin stable around 12-13 range.  Follow-up with PCP as well as follow-up with GI for pending biopsy results from colonoscopy.  Currently hemodynamically stable for discharge.  Patient and mother both are in agreement with above plan.    Hx of Salmonella infection  Assessment & Plan  Plan as above.  Lower abdominal pain  Assessment & Plan  Plan as above.      Medical Problems       Resolved Problems  Date Reviewed: 8/7/2024   None       Discharging Physician / Practitioner: Stephane Mejía MD  PCP: No primary care provider on file.  Admission Date:   Admission Orders (From admission, onward)       Ordered        08/06/24 1059  INPATIENT ADMISSION  Once            08/05/24 1515  Place in Observation  Once             08/05/24 1515  Place in Observation  Once                          Discharge Date: 08/07/24    Consultations During Hospital Stay:  Gastroenterology    Procedures Performed:   Colonoscopy      Reason for Admission: Diarrhea, rectal bleeding, Salmonella colitis    Hospital Course:   Neisha Bowles is a 24 y.o. female patient with past medical history of hearing impairment s/p cochlear implant, recent hospitalization secondary to Salmonella colitis and patient was treated conservatively at the time and discharged home who originally presented to the hospital on 8/5/2024 due to complaining of abdominal pain associate with bloody diarrhea, nausea.  Urine pregnancy test negative. CT abd/pelvis report noted with mild thickening of the descending colon.  Labs noted with mild leukopenia.  Patient remained afebrile and stable off of antibiotics.  Treated with conservative management however patient was seen by GI and she had colonoscopy today and report noted as above.  GI believes her symptoms are likely due to resolving Salmonella colitis versus postinfectious IBS.  Has been cleared by gastroenterologist for discharge on p.o. ciprofloxacin 500 mg twice daily for 5 days.  Biopsy results pending at the time of the discharge.  Currently she is tolerating p.o. intake well without nausea or vomiting.  She is also eager to go home.  Recommend outpatient follow-up with PCP to have repeat blood work in 1 week.  Recommended outpatient follow-up with gastroenterologist for pending biopsy results.  No other events reported.  Patient and her mother both are in agreement with the above plan.  Refer to earlier notes for further clarification.      Please see above list of diagnoses and related plan for additional information.     Condition at Discharge: good    Discharge Day Visit / Exam:   Subjective: Patient was seen during a.m. rounds as well as seen after colonoscopy.  Currently she is tolerating p.o. intake well without further  "episode of nausea or vomiting.  Hemoglobin is stable around 12-13 range.  Symptoms thought to be due to resolving Salmonella colitis as per GI and currently she has been cleared for discharge by GI on p.o. Cipro.  Patient is also eager to go home.  No other events reported.      Vitals: Blood Pressure: 110/66 (08/07/24 1135)  Pulse: 60 (08/07/24 1135)  Temperature: 97.9 °F (36.6 °C) (08/07/24 1115)  Temp Source: Temporal (08/07/24 1115)  Respirations: 17 (08/07/24 1135)  Height: 5' 5\" (165.1 cm) (08/05/24 1806)  Weight - Scale: 69 kg (152 lb 1.9 oz) (08/05/24 1806)  SpO2: 97 % (08/07/24 1135)  Exam:   Physical Exam  Constitutional:       General: She is not in acute distress.     Appearance: Normal appearance. She is not ill-appearing, toxic-appearing or diaphoretic.   HENT:      Head: Normocephalic and atraumatic.   Eyes:      Pupils: Pupils are equal, round, and reactive to light.   Cardiovascular:      Rate and Rhythm: Normal rate.      Pulses: Normal pulses.   Pulmonary:      Effort: Pulmonary effort is normal. No respiratory distress.      Breath sounds: Normal breath sounds. No wheezing.   Abdominal:      General: Bowel sounds are normal. There is no distension.      Palpations: Abdomen is soft.      Tenderness: There is no abdominal tenderness.   Neurological:      Mental Status: She is alert and oriented to person, place, and time.   Psychiatric:         Mood and Affect: Mood normal.         Behavior: Behavior normal.        Discussion with Family: Updated  (mother) via phone.    Discharge instructions/Information to patient and family:   See after visit summary for information provided to patient and family.      Provisions for Follow-Up Care:  See after visit summary for information related to follow-up care and any pertinent home health orders.      Mobility at time of Discharge:   Basic Mobility Inpatient Raw Score: 24  JH-HLM Goal: 8: Walk 250 feet or more  JH-HLM Achieved: 8: Walk 250 " feet ot more       Disposition:       Planned Readmission:      Discharge Statement:  I spent 35 minutes discharging the patient. This time was spent on the day of discharge. I had direct contact with the patient on the day of discharge. Greater than 50% of the total time was spent examining patient, answering all patient questions, arranging and discussing plan of care with patient as well as directly providing post-discharge instructions.  Additional time then spent on discharge activities.    Discharge Medications:  See after visit summary for reconciled discharge medications provided to patient and/or family.      **Please Note: This note may have been constructed using a voice recognition system**

## 2024-08-07 NOTE — PLAN OF CARE
Problem: PAIN - ADULT  Goal: Verbalizes/displays adequate comfort level or baseline comfort level  Description: Interventions:  - Encourage patient to monitor pain and request assistance  - Assess pain using appropriate pain scale  - Administer analgesics based on type and severity of pain and evaluate response  - Implement non-pharmacological measures as appropriate and evaluate response  - Consider cultural and social influences on pain and pain management  - Notify physician/advanced practitioner if interventions unsuccessful or patient reports new pain  Outcome: Progressing     Problem: INFECTION - ADULT  Goal: Absence or prevention of progression during hospitalization  Description: INTERVENTIONS:  - Assess and monitor for signs and symptoms of infection  - Monitor lab/diagnostic results  - Monitor all insertion sites, i.e. indwelling lines, tubes, and drains  - Monitor endotracheal if appropriate and nasal secretions for changes in amount and color  - [place] appropriate cooling/warming therapies per order  - Administer medications as ordered  - Instruct and encourage patient and family to use good hand hygiene technique  - Identify and instruct in appropriate isolation precautions for identified infection/condition  Outcome: Progressing  Goal: Absence of fever/infection during neutropenic period  Description: INTERVENTIONS:  - Monitor WBC    Outcome: Progressing     Problem: SAFETY ADULT  Goal: Patient will remain free of falls  Description: INTERVENTIONS:  - Educate patient/family on patient safety including physical limitations  - Instruct patient to call for assistance with activity   - Consult OT/PT to assist with strengthening/mobility   - Keep Call bell within reach  - Keep bed low and locked with side rails adjusted as appropriate  - Keep care items and personal belongings within reach  - Initiate and maintain comfort rounds  - Make Fall Risk Sign visible to staff  - Offer Toileting every  Hours,  in advance of need  - Initiate/Maintain alarm  - Obtain necessary fall risk management equipment:   - Apply yellow socks and bracelet for high fall risk patients  - Consider moving patient to room near nurses station  Outcome: Progressing  Goal: Maintain or return to baseline ADL function  Description: INTERVENTIONS:  -  Assess patient's ability to carry out ADLs; assess patient's baseline for ADL function and identify physical deficits which impact ability to perform ADLs (bathing, care of mouth/teeth, toileting, grooming, dressing, etc.)  - Assess/evaluate cause of self-care deficits   - Assess range of motion  - Assess patient's mobility; develop plan if impaired  - Assess patient's need for assistive devices and provide as appropriate  - Encourage maximum independence but intervene and supervise when necessary  - Involve family in performance of ADLs  - Assess for home care needs following discharge   - Consider OT consult to assist with ADL evaluation and planning for discharge  - Provide patient education as appropriate  Outcome: Progressing  Goal: Maintains/Returns to pre admission functional level  Description: INTERVENTIONS:  - Perform AM-PAC 6 Click Basic Mobility/ Daily Activity assessment daily.  - Set and communicate daily mobility goal to care team and patient/family/caregiver.   - Collaborate with rehabilitation services on mobility goals if consulted  - Perform Range of Motion  times a day.  - Reposition patient every  hours.  - Dangle patient  times a day  - Stand patient  times a day  - Ambulate patient  times a day  - Out of bed to chair  times a day   - Out of bed for meals times a day  - Out of bed for toileting  - Record patient progress and toleration of activity level   Outcome: Progressing     Problem: DISCHARGE PLANNING  Goal: Discharge to home or other facility with appropriate resources  Description: INTERVENTIONS:  - Identify barriers to discharge w/patient and caregiver  - Arrange for  needed discharge resources and transportation as appropriate  - Identify discharge learning needs (meds, wound care, etc.)  - Arrange for interpretive services to assist at discharge as needed  - Refer to Case Management Department for coordinating discharge planning if the patient needs post-hospital services based on physician/advanced practitioner order or complex needs related to functional status, cognitive ability, or social support system  Outcome: Progressing     Problem: Knowledge Deficit  Goal: Patient/family/caregiver demonstrates understanding of disease process, treatment plan, medications, and discharge instructions  Description: Complete learning assessment and assess knowledge base.  Interventions:  - Provide teaching at level of understanding  - Provide teaching via preferred learning methods  Outcome: Progressing

## 2024-08-07 NOTE — ANESTHESIA POSTPROCEDURE EVALUATION
Post-Op Assessment Note    CV Status:  Stable  Pain Score: 0    Pain management: adequate       Mental Status:  Sleepy   Hydration Status:  Stable   PONV Controlled:  None   Airway Patency:  Patent     Post Op Vitals Reviewed: Yes    No anethesia notable event occurred.    Staff: CRNA               BP 95/51 (08/07/24 1115)    Temp 97.9 °F (36.6 °C) (08/07/24 1115)    Pulse (!) 51 (08/07/24 1115)   Resp 16 (08/07/24 1115)    SpO2 97 % (08/07/24 1115)

## 2024-08-07 NOTE — DISCHARGE INSTR - AVS FIRST PAGE
Recommend close follow-up with primary care provider within 1 week of discharge.  Recommended to have repeat blood work for CBC and BMP within 1 week with primary care provider.  Recommended to follow-up with Gastroenterologist for pending biopsy results from colonoscopy.

## 2024-08-09 PROCEDURE — 88305 TISSUE EXAM BY PATHOLOGIST: CPT | Performed by: PATHOLOGY

## 2024-08-10 LAB — CULTURE ID FROM ENTERIC PCR: ABNORMAL

## 2024-08-11 LAB
ATRIAL RATE: 58 BPM
P AXIS: 3 DEGREES
PR INTERVAL: 148 MS
QRS AXIS: 67 DEGREES
QRSD INTERVAL: 74 MS
QT INTERVAL: 318 MS
QTC INTERVAL: 312 MS
T WAVE AXIS: 39 DEGREES
VENTRICULAR RATE: 58 BPM

## 2024-08-11 PROCEDURE — 93010 ELECTROCARDIOGRAM REPORT: CPT | Performed by: INTERNAL MEDICINE

## 2024-08-12 ENCOUNTER — TELEPHONE (OUTPATIENT)
Age: 24
End: 2024-08-12

## 2024-08-12 NOTE — TELEPHONE ENCOUNTER
----- Message from Jaime Royal MD sent at 8/9/2024  4:45 PM EDT -----  Hi can we call patient and let her know biopsies are benign, healthy and unremarkable and show that her salmonella colitis is improving. Thank you.

## 2024-08-15 LAB — CULTURE ID FROM ENTERIC PCR: ABNORMAL

## (undated) DEVICE — MAYO STAND COVER: Brand: CONVERTORS

## (undated) DEVICE — CAUTERY TIP POLISHER: Brand: DEVON

## (undated) DEVICE — ADHESIVE SKIN HIGH VISCOSITY EXOFIN 1ML

## (undated) DEVICE — BETHLEHEM UNIVERSAL GYN LAP PK: Brand: CARDINAL HEALTH

## (undated) DEVICE — GLOVE INDICATOR PI UNDERGLOVE SZ 6.5 BLUE

## (undated) DEVICE — SCD SEQUENTIAL COMPRESSION COMFORT SLEEVE MEDIUM KNEE LENGTH: Brand: KENDALL SCD

## (undated) DEVICE — DRAPE EQUIPMENT RF WAND

## (undated) DEVICE — 3M™ STERI-STRIP™ REINFORCED ADHESIVE SKIN CLOSURES, R1547, 1/2 IN X 4 IN (12 MM X 100 MM), 6 STRIPS/ENVELOPE: Brand: 3M™ STERI-STRIP™

## (undated) DEVICE — TROCAR: Brand: KII FIOS FIRST ENTRY

## (undated) DEVICE — IRRIG ENDO FLO TUBING

## (undated) DEVICE — CHLORAPREP HI-LITE 26ML ORANGE

## (undated) DEVICE — ENDOPOUCH RETRIEVER SPECIMEN RETRIEVAL BAGS: Brand: ENDOPOUCH RETRIEVER

## (undated) DEVICE — TUBING SMOKE EVAC W/FILTRATION DEVICE PLUMEPORT ACTIV

## (undated) DEVICE — ENDOPATH 5MM CURVED SCISSORS WITH MONOPOLAR CAUTERY: Brand: ENDOPATH

## (undated) DEVICE — SYRINGE 50ML LL

## (undated) DEVICE — HARMONIC 1100 SHEARS, 36CM SHAFT LENGTH: Brand: HARMONIC

## (undated) DEVICE — PENCIL ELECTROSURG E-Z CLEAN -0035H

## (undated) DEVICE — LIGHT HANDLE COVER SLEEVE DISP BLUE STELLAR

## (undated) DEVICE — INTENDED FOR TISSUE SEPARATION, AND OTHER PROCEDURES THAT REQUIRE A SHARP SURGICAL BLADE TO PUNCTURE OR CUT.: Brand: BARD-PARKER SAFETY BLADES SIZE 11, STERILE

## (undated) DEVICE — PVC URETHRAL CATHETER: Brand: DOVER

## (undated) DEVICE — [HIGH FLOW INSUFFLATOR,  DO NOT USE IF PACKAGE IS DAMAGED,  KEEP DRY,  KEEP AWAY FROM SUNLIGHT,  PROTECT FROM HEAT AND RADIOACTIVE SOURCES.]: Brand: PNEUMOSURE

## (undated) DEVICE — CHLORHEXIDINE 4PCT 4 OZ

## (undated) DEVICE — SPONGE 4 X 4 XRAY 16 PLY STRL LF RFD